# Patient Record
Sex: FEMALE | Race: WHITE
[De-identification: names, ages, dates, MRNs, and addresses within clinical notes are randomized per-mention and may not be internally consistent; named-entity substitution may affect disease eponyms.]

---

## 2018-11-07 ENCOUNTER — HOSPITAL ENCOUNTER (INPATIENT)
Dept: HOSPITAL 36 - ER | Age: 83
LOS: 9 days | Discharge: NURSING, REHAB OR LONG-TERM CARE | DRG: 885 | End: 2018-11-16
Attending: PSYCHIATRY & NEUROLOGY | Admitting: PSYCHIATRY & NEUROLOGY
Payer: MEDICARE

## 2018-11-07 DIAGNOSIS — F29: Primary | ICD-10-CM

## 2018-11-07 DIAGNOSIS — M19.90: ICD-10-CM

## 2018-11-07 DIAGNOSIS — I10: ICD-10-CM

## 2018-11-07 DIAGNOSIS — Z88.8: ICD-10-CM

## 2018-11-07 DIAGNOSIS — Z88.2: ICD-10-CM

## 2018-11-07 DIAGNOSIS — E78.5: ICD-10-CM

## 2018-11-07 DIAGNOSIS — F03.90: ICD-10-CM

## 2018-11-07 DIAGNOSIS — F41.9: ICD-10-CM

## 2018-11-07 DIAGNOSIS — Z82.49: ICD-10-CM

## 2018-11-07 PROCEDURE — Z7610: HCPCS

## 2018-11-07 NOTE — ED PHYSICIAN CHART
ED Chief Complaint/HPI





- Patient Information


Date Seen:: 11/07/18


Time Seen:: 23:40


Chief Complaint:: Agitation


History of Present Illness:: 





onset x 3 days of agitation and hostile behavior; no report of trauma, H/As, SIs

, S/T, neck pain, cough, C/P, SOB, Abd. Pain, A/N/V/D/C, fever, chills, or 

urinary s/s


Allergies:: 


 Allergies











Allergy/AdvReac Type Severity Reaction Status Date / Time


 


cilostazol [From Pletal] Allergy   Verified 11/07/18 23:48


 


ciprofloxacin [From Cipro] Allergy   Verified 11/07/18 23:48


 


Sulfa (Sulfonamide Allergy   Verified 11/07/18 23:48





Antibiotics)     


 


varenicline Allergy   Verified 11/07/18 23:48











Historian:: Patient, EMS


Review:: Nurse's Note Reviewed, Old Chart Reviewed, EMS run form Reviewed





ED Review of Systems





- Review of Systems


General/Constitutional: No fever, No chills, No weight loss, No weakness, No 

diaphoresis, No edema, No loss of appetite


Skin: No skin lesions, No rash, No bruising


Head: No headache, No light-headedness


Eyes: No loss of vision, No pain, No diplopia


ENT: No earache, No nasal drainage, No sore throat, No tinnitus


Neck: No neck pain, No swelling, No thyromegaly, No stiffness, No mass noted


Cardio Vascular: No chest pain, No palpitations, No PND, No orthopnea, No edema


Pulmonary: No SOB, No cough, No sputum, No wheezing


GI: No nausea, No vomiting, No diarrhea, No pain, No melena, No hematochezia, 

No constipation, No hematemesis


G/U: No dysuria, No frequency, No hematuria, No nacturia


Ob/Gyn: No vaginal discharge, No abnormal vaginal bleed, No contraction


Musculoskeletal: No bone or joint pain, No back pain, No muscle pain


Endocrine: No polyuria, No polydipsia


Psychiatric: Prior psych history, Depression, Anxiety, No suicidal ideation, No 

homicidal ideation, No auditory hallucination, No visual hallucination


Hematopoietic: No bruising, No lymphadenopathy


Allergic/Immuno: No urticaria, No angioedema


Neurological: No syncope, No focal symptoms, No weakness, No paresthesia, No 

headache, No seizure, No dizziness, No confusion, No vertigo





ED Past Medical History





- Past Medical History


Obtainable: Yes


Past Medical History: HTN, Dyslipidemia, Arthritis, Dementia


Family History: HTN


Social History: Non Smoker, No Alcohol, No Drug Use, , Care Facility


Surgical History: None


Psychiatricy History: Bipolar, Dementia


Medication: Reviewed





Family Medical History





- Family Member


  ** Mother


History Unknown: Yes





ED Physical Exam





- Physical Examination


General/Constitutional: Awake, Well-developed, well-nourished, Alert, No 

distress, GCS 15, Non-toxic appearing, Ambulatory


Head: Atraumatic


Eyes: Lids, conjuctiva normal, PERRL, EOMI


Skin: Nl inspection, No rash, No skin lesions, No ecchymosis, Well hydrated, No 

lymphadenopathy


ENMT: External ears, nose nl, TM canals nl, Nasal exam nl, Lips, teeth, gums nl

, Oropharynx nl, Tonsils nl


Neck: Nontender, Full ROM w/o pain, No JVD, No nuchal rigidity, No bruit, No 

mass, No stridor


Respiratory: Nl effort/Exclusion, Clear to Auscultation, No Wheeze/Rhonchi/Rales


Cardio Vascular: RRR, No murmur, gallop, rubs, NL S1 S2


GI: No tenderness/rebounding/guarding, No organomegaly, No hernia, Normal BS's, 

Nondistended, No mass/bruits, No McBurney tenderness


: No CVA tenderness


Extremities: No tenderness or effusion, Full ROM, normal strength in all 

extremities, No edema, Normal digits & nails


Neuro/Psych: Alert/oriented, DTR's symmetric, Normal sensory exam, Normal motor 

strength, Judgement/insight normal, Mood normal, Normal gait, No focal deficits


Other Neuro/Psych comments:: 





+ Psychomotor Agitation; no SIs; Mood/Affect: Labile


Misc: Normal back, No paraspinal tenderness





ED Labs/Radiology/EKG Results





- Lab Results


Comments:: 





Reviewed





- EKG Interpretations


EKG Time:: 00:05


Rate & Rhythm: 71; NSR


Comments:: 





non-specific st-t changes





ED Septic Shock





- .


Is Septic Shock (SBP<90, OR Lactate>4 mmol\L) present?: No





ED Reassessment (Disposition)





- Reassessment


Reassessment Condition:: Improved





- Diagnosis


Diagnosis:: 





Agitation; Medical Clearance; Psychosis; Bipolar Disorder





- Aftercare/Follow up Instructions


Aftercare/Follow-Up Instructions:: Counseled pt regarding lab results/diagnosis 

& need follow up, Counseled pt & family regarding lab results/diagnosis & need 

follow up





- Patient Disposition


Discharge/Transfer:: Acute Care w/in this hosp


Admitted to:: [facility]


Condition at Disposition:: Stable, Improved

## 2018-11-08 VITALS — DIASTOLIC BLOOD PRESSURE: 58 MMHG | SYSTOLIC BLOOD PRESSURE: 116 MMHG

## 2018-11-08 LAB
ALBUMIN SERPL-MCNC: 3.1 GM/DL (ref 3.7–5.3)
ALBUMIN/GLOB SERPL: 1 {RATIO} (ref 1–1.8)
ALP SERPL-CCNC: 143 U/L (ref 34–104)
ALT SERPL-CCNC: 12 U/L (ref 7–52)
AMPHET UR-MCNC: NEGATIVE NG/ML
ANION GAP SERPL CALC-SCNC: 11.1 MMOL/L (ref 7–16)
APAP SERPL-MCNC: < 10 UG/ML (ref 10–30)
APPEARANCE UR: CLEAR
AST SERPL-CCNC: 33 U/L (ref 13–39)
BACTERIA #/AREA URNS HPF: (no result) /HPF
BARBITURATES UR-MCNC: NEGATIVE UG/ML
BASOPHILS # BLD AUTO: 0.1 TH/CUMM (ref 0–0.2)
BASOPHILS NFR BLD AUTO: 1 % (ref 0–2)
BENZODIAZEPINES PNL UR: NEGATIVE
BILIRUB SERPL-MCNC: 0.2 MG/DL (ref 0.3–1)
BILIRUB UR-MCNC: NEGATIVE MG/DL
BUN SERPL-MCNC: 42 MG/DL (ref 7–25)
CALCIUM SERPL-MCNC: 8.8 MG/DL (ref 8.6–10.3)
CANNABINOIDS SERPL QL CFM: NEGATIVE
CHLORIDE SERPL-SCNC: 99 MEQ/L (ref 98–107)
CHOLEST SERPL-MCNC: 127 MG/DL (ref ?–200)
CHOLEST SERPL-MCNC: 142 MG/DL (ref ?–200)
CO2 SERPL-SCNC: 26.2 MEQ/L (ref 21–31)
COCAINE METAB.OTHER UR-MCNC: NEGATIVE NG/ML
COLOR UR: (no result)
CREAT SERPL-MCNC: 0.9 MG/DL (ref 0.6–1.2)
EOSINOPHIL # BLD AUTO: 0.4 TH/CMM (ref 0.1–0.4)
EOSINOPHIL NFR BLD AUTO: 5.4 % (ref 0–5)
EPI CELLS URNS QL MICRO: (no result) /LPF
ERYTHROCYTE [DISTWIDTH] IN BLOOD BY AUTOMATED COUNT: 16 % (ref 11.5–20)
GLOBULIN SER-MCNC: 3.1 GM/DL
GLUCOSE SERPL-MCNC: 145 MG/DL (ref 70–105)
GLUCOSE UR STRIP-MCNC: NEGATIVE MG/DL
HCT VFR BLD CALC: 29.5 % (ref 41–60)
HDLC SERPL-MCNC: 52 MG/DL (ref 23–92)
HDLC SERPL-MCNC: 58 MG/DL (ref 23–92)
HGB BLD-MCNC: 10.1 GM/DL (ref 12–16)
KETONES UR STRIP-MCNC: (no result) MG/DL
LEUKOCYTE ESTERASE UR-ACNC: NEGATIVE
LYMPHOCYTE AB SER FC-ACNC: 1.7 TH/CMM (ref 1.5–3)
LYMPHOCYTES NFR BLD AUTO: 20.6 % (ref 20–50)
MCH RBC QN AUTO: 30 PG (ref 27–31)
MCHC RBC AUTO-ENTMCNC: 34.2 PG (ref 28–36)
MCV RBC AUTO: 87.6 FL (ref 81–100)
METHADONE UR CFM-MCNC: NEGATIVE NG/ML
METHAMPHET UR QL: NEGATIVE
MICRO URNS: YES
MONOCYTES # BLD AUTO: 0.9 TH/CMM (ref 0.3–1)
MONOCYTES NFR BLD AUTO: 11.4 % (ref 2–10)
NEUTROPHILS # BLD: 5 TH/CMM (ref 1.8–8)
NEUTROPHILS NFR BLD AUTO: 61.6 % (ref 40–80)
NITRITE UR QL STRIP: NEGATIVE
OPIATES UR QL: NEGATIVE
PCP UR-MCNC: NEGATIVE UG/L
PH UR STRIP: 5.5 [PH] (ref 4.6–8)
PLATELET # BLD: 343 TH/CMM (ref 150–400)
PMV BLD AUTO: 7.6 FL
POTASSIUM SERPL-SCNC: 4.3 MEQ/L (ref 3.5–5.1)
PROT UR STRIP-MCNC: NEGATIVE MG/DL
RBC # BLD AUTO: 3.36 MIL/CMM (ref 3.8–5.2)
RBC # UR STRIP: NEGATIVE /UL
RBC #/AREA URNS HPF: (no result) /HPF (ref 0–5)
SALICYLATES SERPL-MCNC: < 25 MG/L (ref 30–100)
SODIUM SERPL-SCNC: 132 MEQ/L (ref 136–145)
SP GR UR STRIP: 1.02 (ref 1–1.03)
TRICYCLICS UR QL: NEGATIVE
TRIGL SERPL-MCNC: 51 MG/DL (ref ?–150)
TRIGL SERPL-MCNC: 70 MG/DL (ref ?–150)
URINALYSIS COMPLETE PNL UR: (no result)
UROBILINOGEN UR STRIP-ACNC: 0.2 E.U./DL (ref 0.2–1)
WBC # BLD AUTO: 8.1 TH/CMM (ref 4.8–10.8)
WBC #/AREA URNS HPF: (no result) /HPF (ref 0–5)

## 2018-11-08 RX ADMIN — TOLTERODINE TARTRATE SCH: 4 CAPSULE, EXTENDED RELEASE ORAL at 09:56

## 2018-11-08 RX ADMIN — FLUTICASONE PROPIONATE SCH: 50 SPRAY, METERED NASAL at 09:56

## 2018-11-08 RX ADMIN — ASPIRIN 81 MG SCH MG: 81 TABLET ORAL at 09:33

## 2018-11-09 RX ADMIN — TOLTERODINE TARTRATE SCH MG: 4 CAPSULE, EXTENDED RELEASE ORAL at 09:22

## 2018-11-09 RX ADMIN — FLUTICASONE PROPIONATE SCH SPR: 50 SPRAY, METERED NASAL at 09:21

## 2018-11-09 RX ADMIN — ASPIRIN 81 MG SCH MG: 81 TABLET ORAL at 09:20
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
## 2018-11-09 NOTE — INTERNAL MEDICINE PROG NOTE
Internal Medicine Subjective





- Subjective


Patient seen and examined:: chart reviewed


Patient is:: awake, agitated, confused


Per staff patient has:: agitated, tolerating meds





Internal Medicine Objective





- Results


Result Diagrams: 


 11/07/18 00:00





 11/07/18 00:00


Recent Labs: 


 Laboratory Last Values











WBC  8.1 Th/cmm (4.8-10.8)   11/07/18  00:00    


 


RBC  3.36 Mil/cmm (3.80-5.20)  L  11/07/18  00:00    


 


Hgb  10.1 gm/dL (12-16)  L  11/07/18  00:00    


 


Hct  29.5 % (41.0-60)  L  11/07/18  00:00    


 


MCV  87.6 fl ()   11/07/18  00:00    


 


MCH  30.0 pg (27.0-31.0)   11/07/18  00:00    


 


MCHC Differential  34.2 pg (28.0-36.0)   11/07/18  00:00    


 


RDW  16.0 % (11.5-20.0)   11/07/18  00:00    


 


Plt Count  343 Th/cmm (150-400)   11/07/18  00:00    


 


MPV  7.6 fl  11/07/18  00:00    


 


Neutrophils %  61.6 % (40.0-80.0)   11/07/18  00:00    


 


Lymphocytes %  20.6 % (20.0-50.0)   11/07/18  00:00    


 


Monocytes %  11.4 % (2.0-10.0)  H  11/07/18  00:00    


 


Eosinophils %  5.4 % (0.0-5.0)  H  11/07/18  00:00    


 


Basophils %  1.0 % (0.0-2.0)   11/07/18  00:00    


 


Sodium  132 mEq/L (136-145)  L  11/07/18  00:00    


 


Potassium  4.3 mEq/L (3.5-5.1)   11/07/18  00:00    


 


Chloride  99 mEq/L ()   11/07/18  00:00    


 


Carbon Dioxide  26.2 mEq/L (21.0-31.0)   11/07/18  00:00    


 


Anion Gap  11.1  (7.0-16.0)   11/07/18  00:00    


 


BUN  42 mg/dL (7-25)  H  11/07/18  00:00    


 


Creatinine  0.9 mg/dL (0.6-1.2)   11/07/18  00:00    


 


Est GFR ( Amer)  TNP   11/07/18  00:00    


 


Est GFR (Non-Af Amer)  TNP   11/07/18  00:00    


 


BUN/Creatinine Ratio  46.7   11/07/18  00:00    


 


Glucose  145 mg/dL ()  H  11/07/18  00:00    


 


Calcium  8.8 mg/dL (8.6-10.3)   11/07/18  00:00    


 


Total Bilirubin  0.2 mg/dL (0.3-1.0)  L  11/07/18  00:00    


 


AST  33 U/L (13-39)   11/07/18  00:00    


 


ALT  12 U/L (7-52)   11/07/18  00:00    


 


Alkaline Phosphatase  143 U/L ()  H  11/07/18  00:00    


 


Troponin I  0.02 ng/mL (0.01-0.05)   11/07/18  00:00    


 


Total Protein  6.2 gm/dL (6.0-8.3)   11/07/18  00:00    


 


Albumin  3.1 gm/dL (3.7-5.3)  L  11/07/18  00:00    


 


Globulin  3.1 gm/dL  11/07/18  00:00    


 


Albumin/Globulin Ratio  1.0  (1.0-1.8)   11/07/18  00:00    


 


Triglycerides  51 mg/dL (<150)   11/08/18  07:29    


 


Cholesterol  142 mg/dL (<200)   11/08/18  07:29    


 


LDL Cholesterol Direct  77 mg/dL ()   11/08/18  07:29    


 


HDL Cholesterol  58 mg/dL (23-92)   11/08/18  07:29    


 


TSH  2.21 uIU/ml (0.34-5.60)   11/07/18  00:00    


 


Urine Source  CATH   11/08/18  00:55    


 


Urine Color  STRAW   11/08/18  00:55    


 


Urine Clarity  CLEAR  (CLEAR)   11/08/18  00:55    


 


Urine pH  5.5  (4.6 - 8.0)   11/08/18  00:55    


 


Ur Specific Gravity  1.025  (1.005-1.030)   11/08/18  00:55    


 


Urine Protein  NEGATIVE mg/dL (NEGATIVE)   11/08/18  00:55    


 


Urine Glucose (UA)  NEGATIVE mg/dL (NEGATIVE)   11/08/18  00:55    


 


Urine Ketones  TRACE mg/dL (NEGATIVE)   11/08/18  00:55    


 


Urine Blood  NEGATIVE  (NEGATIVE)   11/08/18  00:55    


 


Urine Nitrate  NEGATIVE  (NEGATIVE)   11/08/18  00:55    


 


Urine Bilirubin  NEGATIVE  (NEGATIVE)   11/08/18  00:55    


 


Urine Urobilinogen  0.2 E.U./dL (0.2 - 1.0)   11/08/18  00:55    


 


Ur Leukocyte Esterase  NEGATIVE  (NEGATIVE)   11/08/18  00:55    


 


Urine RBC  NONE SEEN /hpf (0-5)   11/08/18  00:55    


 


Urine WBC  0-2 /hpf (0-5)   11/08/18  00:55    


 


Ur Epithelial Cells  FEW /lpf (FEW)   11/08/18  00:55    


 


Urine Bacteria  NONE SEEN /hpf (NONE SEEN)   11/08/18  00:55    


 


Salicylates  < 25.0 mg/L (30.0-100.0)  L  11/07/18  00:00    


 


Urine Opiates Screen  NEGATIVE  (NEGATIVE)   11/08/18  00:55    


 


Urine Methadone Screen  NEGATIVE  (NEGATIVE)   11/08/18  00:55    


 


Acetaminophen  < 10.0 ug/mL (10.0-30.0)  L  11/07/18  00:00    


 


Ur Barbiturates Screen  NEGATIVE  (NEGATIVE)   11/08/18  00:55    


 


Ur Tricyclics Screen  NEGATIVE  (NEGATIVE)   11/08/18  00:55    


 


Ur Phencyclidine Scrn  NEGATIVE  (NEGATIVE)   11/08/18  00:55    


 


Amphetamines Screen  NEGATIVE  (NEGATIVE)   11/08/18  00:55    


 


U Methamphetamines Scrn  NEGATIVE  (NEGATIVE)   11/08/18  00:55    


 


U Benzodiazepines Scrn  NEGATIVE  (NEGATIVE)   11/08/18  00:55    


 


U Cocaine Metab Screen  NEGATIVE  (NEGATIVE)   11/08/18  00:55    


 


U Cannabinoids Screen  NEGATIVE  (NEGATIVE)   11/08/18  00:55    


 


Ethyl Alcohol  < 10 mg/dL (0-10)   11/07/18  00:00    














- Physical Exam


Vitals and I&O: 


 Vital Signs











Temp  97.1 F   11/08/18 21:03


 


Pulse  86   11/08/18 21:24


 


Resp  20   11/08/18 21:03


 


BP  128/52   11/08/18 21:24


 


Pulse Ox  98   11/08/18 21:03








 Intake & Output











 11/08/18 11/09/18 11/09/18





 18:59 06:59 18:59


 


Intake Total 800 120 


 


Balance 800 120 


 


Intake:   


 


  Oral 800 120 


 


Other:   


 


  # Voids 3 3 


 


  # Bowel Movements 0  











Active Medications: 


Current Medications





Acetaminophen (Tylenol)  650 mg PO Q4HR PRN


   PRN Reason: Mild Pain / Temp above 100


   Stop: 01/07/19 02:37


Acetaminophen (Tylenol)  650 mg PO Q4HR PRN


   PRN Reason: Pain (Mild)


   Stop: 01/07/19 07:56


Al Hydrox/Mg Hydrox/Simethicone (Maalox)  30 ml PO Q4HR PRN


   PRN Reason: GI DISTRESS


   Stop: 01/07/19 02:37


Aspirin (Aspirin Chewable)  81 mg PO DAILY [facility]


   Stop: 01/07/19 08:59


   Last Admin: 11/09/18 09:20 Dose:  81 mg


Bisacodyl (Dulcolax 10 Mg Supp)  10 mg RC DAILY PRN


   PRN Reason: Constipation


   Stop: 01/07/19 03:02


Buspirone HCl (Buspar)  10 mg PO BID [facility]; Protocol


   Stop: 01/07/19 08:59


   Last Admin: 11/09/18 09:20 Dose:  10 mg


Calcium Carbonate (Os-[place])  500 mg PO DAILY [facility]


   Stop: 01/07/19 08:59


   Last Admin: 11/09/18 09:21 Dose:  500 mg


Cholecalciferol (Vitamin D3)  1,000 iu PO DAILY [facility]


   Stop: 01/07/19 08:59


   Last Admin: 11/09/18 09:22 Dose:  1,000 iu


Cilostazol (Pletal)  100 mg PO BID [facility]


   Stop: 01/07/19 08:59


   Last Admin: 11/09/18 09:24 Dose:  Not Given


Docusate Sodium (Colace)  100 mg PO BID [facility]


   Stop: 01/07/19 08:59


   Last Admin: 11/09/18 09:21 Dose:  100 mg


Donepezil HCl (Aricept)  10 mg PO HS [facility]


   Stop: 01/07/19 20:59


   Last Admin: 11/08/18 21:22 Dose:  10 mg


Famotidine (Pepcid)  20 mg PO BID [facility]


   Stop: 01/07/19 08:59


   Last Admin: 11/09/18 09:21 Dose:  20 mg


Fluticasone Propionate (Flonase)  2 spr NS DAILY [facility]


   Stop: 01/07/19 08:59


   Last Admin: 11/09/18 09:21 Dose:  2 spr


Gabapentin (Neurontin)  300 mg PO HS [facility]


   Stop: 01/07/19 20:59


   Last Admin: 11/08/18 21:22 Dose:  300 mg


Lorazepam (Ativan)  0.5 mg PO Q4HR PRN; Protocol


   PRN Reason: Anxiety


   Stop: 12/08/18 02:37


Magnesium Hydroxide (Milk Of Magnesia)  30 ml PO HS PRN


   PRN Reason: Constipation


   Stop: 01/07/19 07:56


Memantine (Namenda)  10 mg PO DAILY [facility]


   Stop: 01/07/19 08:59


   Last Admin: 11/09/18 09:21 Dose:  10 mg


Metoprolol Tartrate (Lopressor)  25 mg PO Q12HR [facility]


   Stop: 01/07/19 08:59


   Last Admin: 11/09/18 09:21 Dose:  Not Given


Quetiapine Fumarate (Seroquel)  12.5 mg PO BID [facility]; Protocol


   Stop: 01/08/19 16:59


Sodium Phosphate (Fleet Enema)  135 ml RC Q48HR PRN


   PRN Reason: Constipation


   Stop: 01/07/19 03:02


Tolterodine Tartrate (Detrol La)  4 mg PO DAILY [facility]


   Stop: 01/07/19 08:59


   Last Admin: 11/09/18 09:22 Dose:  4 mg


Zolpidem Tartrate (Ambien)  5 mg PO HS PRN


   PRN Reason: Insomnia


   Stop: 01/07/19 02:37








General: demented


HEENT: NC/AT


Neck: Supple


Lungs: CTAB


Cardiovascular: Normal S1, Normal S2


Abdomen: soft, non-tender


Extremities: clear


Neurological: no change





Internal Medicine Assmt/Plan





- Assessment


Assessment: 





severe agitation 


bipolar disease 


h/o dementia


h/o arthritis 


h/o htn


h/o hyperlipidemia








- Plan


Plan: 





as per psych


will monitor

## 2018-11-10 RX ADMIN — TOLTERODINE TARTRATE SCH MG: 4 CAPSULE, EXTENDED RELEASE ORAL at 09:24

## 2018-11-10 RX ADMIN — ASPIRIN 81 MG SCH MG: 81 TABLET ORAL at 09:23

## 2018-11-10 RX ADMIN — FLUTICASONE PROPIONATE SCH SPR: 50 SPRAY, METERED NASAL at 09:23

## 2018-11-10 NOTE — PROGRESS NOTES
DATE:  11/10/2018



SUBJECTIVE:  The patient was seen at the hallway sitting in a [place] chair.  The

patient is awake, but confused and easily gets agitated and frustrated, appears

to be guarded.  Otherwise, the patient is in no acute distress.



OBJECTIVE:

VITAL SIGNS:  Temperature 97.8, heart rate 76, blood pressure 164/66,

respirations 20, and 96% on room air.

HEENT:  Head is atraumatic and normocephalic.  Eyes:  Bilateral conjunctivae are

clear.  Bilateral pupils are equally round and reactive.

NECK:  Supple.  No JVD.

CARDIOVASCULAR:  S1 and S2, without murmur.

PULMONARY:  Clear to auscultation.

GASTROINTESTINAL:  Soft and nontender without guarding.  Positive bowel sounds.

MUSCULOSKELETAL:  No clubbing.  No cyanosis noted.



ASSESSMENT:

1.  Psychosis.

2.  Hypertension.

3.  Hyperlipidemia.

4.  Osteoarthritis.



PLAN:  We will keep the patient inpatient Psychiatric Unit.  We will follow up

with the psychiatrist to monitor the patient's condition and behavior. 

Treatment plans were discussed with the patient's nurse.  Treatment plans were

discussed with Dr. Gomes.  Also going to start the patient on clonidine 0.1 mg

every 8 hours as needed for a systolic blood pressure above 160.





DD: 11/10/2018 07:53

DT: 11/10/2018 16:22

JOB# [id number]  [id number]

## 2018-11-11 RX ADMIN — FLUTICASONE PROPIONATE SCH: 50 SPRAY, METERED NASAL at 09:42

## 2018-11-11 RX ADMIN — TOLTERODINE TARTRATE SCH: 4 CAPSULE, EXTENDED RELEASE ORAL at 16:10

## 2018-11-11 RX ADMIN — ASPIRIN 81 MG SCH MG: 81 TABLET ORAL at 09:37
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
## 2018-11-11 NOTE — INTERNAL MEDICINE PROG NOTE
Internal Medicine Subjective





- Subjective


Patient seen and examined:: chart reviewed


Patient is:: awake, agitated, confused


Per staff patient has:: agitated, tolerating meds





Internal Medicine Objective





- Results


Result Diagrams: 


 11/07/18 00:00





 11/07/18 00:00


Recent Labs: 


 Laboratory Last Values











WBC  8.1 Th/cmm (4.8-10.8)   11/07/18  00:00    


 


RBC  3.36 Mil/cmm (3.80-5.20)  L  11/07/18  00:00    


 


Hgb  10.1 gm/dL (12-16)  L  11/07/18  00:00    


 


Hct  29.5 % (41.0-60)  L  11/07/18  00:00    


 


MCV  87.6 fl ()   11/07/18  00:00    


 


MCH  30.0 pg (27.0-31.0)   11/07/18  00:00    


 


MCHC Differential  34.2 pg (28.0-36.0)   11/07/18  00:00    


 


RDW  16.0 % (11.5-20.0)   11/07/18  00:00    


 


Plt Count  343 Th/cmm (150-400)   11/07/18  00:00    


 


MPV  7.6 fl  11/07/18  00:00    


 


Neutrophils %  61.6 % (40.0-80.0)   11/07/18  00:00    


 


Lymphocytes %  20.6 % (20.0-50.0)   11/07/18  00:00    


 


Monocytes %  11.4 % (2.0-10.0)  H  11/07/18  00:00    


 


Eosinophils %  5.4 % (0.0-5.0)  H  11/07/18  00:00    


 


Basophils %  1.0 % (0.0-2.0)   11/07/18  00:00    


 


Sodium  132 mEq/L (136-145)  L  11/07/18  00:00    


 


Potassium  4.3 mEq/L (3.5-5.1)   11/07/18  00:00    


 


Chloride  99 mEq/L ()   11/07/18  00:00    


 


Carbon Dioxide  26.2 mEq/L (21.0-31.0)   11/07/18  00:00    


 


Anion Gap  11.1  (7.0-16.0)   11/07/18  00:00    


 


BUN  42 mg/dL (7-25)  H  11/07/18  00:00    


 


Creatinine  0.9 mg/dL (0.6-1.2)   11/07/18  00:00    


 


Est GFR ( Amer)  TNP   11/07/18  00:00    


 


Est GFR (Non-Af Amer)  TNP   11/07/18  00:00    


 


BUN/Creatinine Ratio  46.7   11/07/18  00:00    


 


Glucose  145 mg/dL ()  H  11/07/18  00:00    


 


Calcium  8.8 mg/dL (8.6-10.3)   11/07/18  00:00    


 


Total Bilirubin  0.2 mg/dL (0.3-1.0)  L  11/07/18  00:00    


 


AST  33 U/L (13-39)   11/07/18  00:00    


 


ALT  12 U/L (7-52)   11/07/18  00:00    


 


Alkaline Phosphatase  143 U/L ()  H  11/07/18  00:00    


 


Troponin I  0.02 ng/mL (0.01-0.05)   11/07/18  00:00    


 


Total Protein  6.2 gm/dL (6.0-8.3)   11/07/18  00:00    


 


Albumin  3.1 gm/dL (3.7-5.3)  L  11/07/18  00:00    


 


Globulin  3.1 gm/dL  11/07/18  00:00    


 


Albumin/Globulin Ratio  1.0  (1.0-1.8)   11/07/18  00:00    


 


Triglycerides  51 mg/dL (<150)   11/08/18  07:29    


 


Cholesterol  142 mg/dL (<200)   11/08/18  07:29    


 


LDL Cholesterol Direct  77 mg/dL ()   11/08/18  07:29    


 


HDL Cholesterol  58 mg/dL (23-92)   11/08/18  07:29    


 


TSH  2.21 uIU/ml (0.34-5.60)   11/07/18  00:00    


 


Urine Source  CATH   11/08/18  00:55    


 


Urine Color  STRAW   11/08/18  00:55    


 


Urine Clarity  CLEAR  (CLEAR)   11/08/18  00:55    


 


Urine pH  5.5  (4.6 - 8.0)   11/08/18  00:55    


 


Ur Specific Gravity  1.025  (1.005-1.030)   11/08/18  00:55    


 


Urine Protein  NEGATIVE mg/dL (NEGATIVE)   11/08/18  00:55    


 


Urine Glucose (UA)  NEGATIVE mg/dL (NEGATIVE)   11/08/18  00:55    


 


Urine Ketones  TRACE mg/dL (NEGATIVE)   11/08/18  00:55    


 


Urine Blood  NEGATIVE  (NEGATIVE)   11/08/18  00:55    


 


Urine Nitrate  NEGATIVE  (NEGATIVE)   11/08/18  00:55    


 


Urine Bilirubin  NEGATIVE  (NEGATIVE)   11/08/18  00:55    


 


Urine Urobilinogen  0.2 E.U./dL (0.2 - 1.0)   11/08/18  00:55    


 


Ur Leukocyte Esterase  NEGATIVE  (NEGATIVE)   11/08/18  00:55    


 


Urine RBC  NONE SEEN /hpf (0-5)   11/08/18  00:55    


 


Urine WBC  0-2 /hpf (0-5)   11/08/18  00:55    


 


Ur Epithelial Cells  FEW /lpf (FEW)   11/08/18  00:55    


 


Urine Bacteria  NONE SEEN /hpf (NONE SEEN)   11/08/18  00:55    


 


Salicylates  < 25.0 mg/L (30.0-100.0)  L  11/07/18  00:00    


 


Urine Opiates Screen  NEGATIVE  (NEGATIVE)   11/08/18  00:55    


 


Urine Methadone Screen  NEGATIVE  (NEGATIVE)   11/08/18  00:55    


 


Acetaminophen  < 10.0 ug/mL (10.0-30.0)  L  11/07/18  00:00    


 


Ur Barbiturates Screen  NEGATIVE  (NEGATIVE)   11/08/18  00:55    


 


Ur Tricyclics Screen  NEGATIVE  (NEGATIVE)   11/08/18  00:55    


 


Ur Phencyclidine Scrn  NEGATIVE  (NEGATIVE)   11/08/18  00:55    


 


Amphetamines Screen  NEGATIVE  (NEGATIVE)   11/08/18  00:55    


 


U Methamphetamines Scrn  NEGATIVE  (NEGATIVE)   11/08/18  00:55    


 


U Benzodiazepines Scrn  NEGATIVE  (NEGATIVE)   11/08/18  00:55    


 


U Cocaine Metab Screen  NEGATIVE  (NEGATIVE)   11/08/18  00:55    


 


U Cannabinoids Screen  NEGATIVE  (NEGATIVE)   11/08/18  00:55    


 


Ethyl Alcohol  < 10 mg/dL (0-10)   11/07/18  00:00    


 


RPR  NONREACTIVE  (NONREACTIVE)   11/07/18  00:00    














- Physical Exam


Vitals and I&O: 


 Vital Signs











Temp  97.2 F   11/11/18 06:30


 


Pulse  94   11/11/18 09:36


 


Resp  20   11/11/18 06:30


 


BP  166/96   11/11/18 09:36


 


Pulse Ox  96   11/11/18 06:30








 Intake & Output











 11/10/18 11/11/18 11/11/18





 18:59 06:59 18:59


 


Intake Total 1000 120 


 


Balance 1000 120 


 


Intake:   


 


  Oral 1000 120 


 


Other:   


 


  # Voids 4 1 


 


  # Bowel Movements 1  











Active Medications: 


Current Medications





Acetaminophen (Tylenol)  650 mg PO Q4HR PRN


   PRN Reason: Mild Pain / Temp above 100


   Stop: 01/07/19 02:37


Acetaminophen (Tylenol)  650 mg PO Q4HR PRN


   PRN Reason: Pain (Mild)


   Stop: 01/07/19 07:56


Al Hydrox/Mg Hydrox/Simethicone (Maalox)  30 ml PO Q4HR PRN


   PRN Reason: GI DISTRESS


   Stop: 01/07/19 02:37


Aspirin (Aspirin Chewable)  81 mg PO DAILY [facility]


   Stop: 01/07/19 08:59


   Last Admin: 11/11/18 09:37 Dose:  81 mg


Bisacodyl (Dulcolax 10 Mg Supp)  10 mg RC DAILY PRN


   PRN Reason: Constipation


   Stop: 01/07/19 03:02


Buspirone HCl (Buspar)  10 mg PO BID [facility]; Protocol


   Stop: 01/07/19 08:59


   Last Admin: 11/11/18 09:40 Dose:  10 mg


Calcium Carbonate (Os-[place])  500 mg PO DAILY [facility]


   Stop: 01/07/19 08:59


   Last Admin: 11/11/18 09:41 Dose:  500 mg


Cholecalciferol (Vitamin D3)  1,000 iu PO DAILY [facility]


   Stop: 01/07/19 08:59


   Last Admin: 11/11/18 09:37 Dose:  1,000 iu


Docusate Sodium (Colace)  100 mg PO BID [facility]


   Stop: 01/07/19 08:59


   Last Admin: 11/11/18 09:40 Dose:  100 mg


Donepezil HCl (Aricept)  10 mg PO [facility]


   Stop: 01/07/19 20:59


   Last Admin: 11/10/18 20:59 Dose:  10 mg


Famotidine (Pepcid)  20 mg PO BID [facility]


   Stop: 01/07/19 08:59


   Last Admin: 11/11/18 09:41 Dose:  20 mg


Fluticasone Propionate (Flonase)  2 spr NS DAILY [facility]


   Stop: 01/07/19 08:59


   Last Admin: 11/11/18 09:42 Dose:  Not Given


Gabapentin (Neurontin)  300 mg PO HS [facility]


   Stop: 01/07/19 20:59


   Last Admin: 11/10/18 20:59 Dose:  300 mg


Lorazepam (Ativan)  0.5 mg PO Q4HR PRN; Protocol


   PRN Reason: Anxiety


   Stop: 12/08/18 02:37


   Last Admin: 11/10/18 16:44 Dose:  0.5 mg


Magnesium Hydroxide (Milk Of Magnesia)  30 ml PO HS PRN


   PRN Reason: Constipation


   Stop: 01/07/19 07:56


Memantine (Namenda)  10 mg PO DAILY [facility]


   Stop: 01/07/19 08:59


   Last Admin: 11/11/18 09:41 Dose:  10 mg


Metoprolol Tartrate (Lopressor)  25 mg PO Q12HR [facility]


   Stop: 01/07/19 08:59


   Last Admin: 11/11/18 09:36 Dose:  25 mg


Quetiapine Fumarate (Seroquel)  12.5 mg PO BID [facility]; Protocol


   Stop: 01/08/19 16:59


   Last Admin: 11/11/18 09:37 Dose:  12.5 mg


Sodium Phosphate (Fleet Enema)  135 ml RC Q48HR PRN


   PRN Reason: Constipation


   Stop: 01/07/19 03:02


Tolterodine Tartrate (Detrol La)  4 mg PO DAILY [facility]


   Stop: 01/07/19 08:59


   Last Admin: 11/10/18 09:24 Dose:  4 mg


Zolpidem Tartrate (Ambien)  5 mg PO HS PRN


   PRN Reason: Insomnia


   Stop: 01/07/19 02:37


   Last Admin: 11/10/18 20:59 Dose:  5 mg








General: demented


HEENT: NC/AT


Neck: Supple


Lungs: CTAB


Cardiovascular: Normal S1, Normal S2


Abdomen: soft, non-tender


Extremities: clear


Neurological: no change





Internal Medicine Assmt/Plan





- Assessment


Assessment: 





severe agitation 


bipolar disease 


h/o dementia


h/o arthritis 


h/o htn


h/o hyperlipidemia








- Plan


Plan: 





as per psych


will monitor

## 2018-11-11 NOTE — PROGRESS NOTES
DATE:  11/10/2018



SUBJECTIVE:  The patient was seen and evaluated.  The patient's chart reviewed.



IDENTIFYING DATA:  An 83-year-old female who [facility] because the patient became verbally and physically

aggressive, during the hospital course, the patient mostly been disengaged,

withdrawn, irritable, agitated.  Today on face-to-face evaluation, the patient

is minimally interactive, difficult to engage in a linear conversation, easily

derails.



MEDICATIONS:  Reviewed.  BuSpar 10 mg p.o. b.i.d., vitamin D, Colace, Aricept 10

mg, Pepcid, Neurontin _____ mg p.o. at bedtime, Ativan as needed, Namenda 10 mg

p.o. every day, Seroquel at 12.5 b.i.d. with metoprolol and sodium phosphate.



ASSESSMENT AND PLAN:  The patient continues to be disorganized, easily derails

in conversation and need a lot of redirection, unable to formulate a plan

outside for environment.  We will continue with primary psychiatrist's treatment

plan and goals.





DD: 11/10/2018 14:33

DT: 11/11/2018 06:45

JOB# [id number]  [id number]

## 2018-11-12 RX ADMIN — ASPIRIN 81 MG SCH: 81 TABLET ORAL at 09:17

## 2018-11-12 RX ADMIN — TOLTERODINE TARTRATE SCH: 4 CAPSULE, EXTENDED RELEASE ORAL at 09:17

## 2018-11-12 RX ADMIN — FLUTICASONE PROPIONATE SCH: 50 SPRAY, METERED NASAL at 09:17

## 2018-11-12 NOTE — INTERNAL MEDICINE PROG NOTE
Internal Medicine Subjective





- Subjective


Patient seen and examined:: chart reviewed


Patient is:: awake, confused


Per staff patient has:: tolerating meds





Internal Medicine Objective





- Results


Result Diagrams: 


 11/07/18 00:00





 11/07/18 00:00


Recent Labs: 


 Laboratory Last Values











WBC  8.1 Th/cmm (4.8-10.8)   11/07/18  00:00    


 


RBC  3.36 Mil/cmm (3.80-5.20)  L  11/07/18  00:00    


 


Hgb  10.1 gm/dL (12-16)  L  11/07/18  00:00    


 


Hct  29.5 % (41.0-60)  L  11/07/18  00:00    


 


MCV  87.6 fl ()   11/07/18  00:00    


 


MCH  30.0 pg (27.0-31.0)   11/07/18  00:00    


 


MCHC Differential  34.2 pg (28.0-36.0)   11/07/18  00:00    


 


RDW  16.0 % (11.5-20.0)   11/07/18  00:00    


 


Plt Count  343 Th/cmm (150-400)   11/07/18  00:00    


 


MPV  7.6 fl  11/07/18  00:00    


 


Neutrophils %  61.6 % (40.0-80.0)   11/07/18  00:00    


 


Lymphocytes %  20.6 % (20.0-50.0)   11/07/18  00:00    


 


Monocytes %  11.4 % (2.0-10.0)  H  11/07/18  00:00    


 


Eosinophils %  5.4 % (0.0-5.0)  H  11/07/18  00:00    


 


Basophils %  1.0 % (0.0-2.0)   11/07/18  00:00    


 


Sodium  132 mEq/L (136-145)  L  11/07/18  00:00    


 


Potassium  4.3 mEq/L (3.5-5.1)   11/07/18  00:00    


 


Chloride  99 mEq/L ()   11/07/18  00:00    


 


Carbon Dioxide  26.2 mEq/L (21.0-31.0)   11/07/18  00:00    


 


Anion Gap  11.1  (7.0-16.0)   11/07/18  00:00    


 


BUN  42 mg/dL (7-25)  H  11/07/18  00:00    


 


Creatinine  0.9 mg/dL (0.6-1.2)   11/07/18  00:00    


 


Est GFR ( Amer)  TNP   11/07/18  00:00    


 


Est GFR (Non-Af Amer)  TNP   11/07/18  00:00    


 


BUN/Creatinine Ratio  46.7   11/07/18  00:00    


 


Glucose  145 mg/dL ()  H  11/07/18  00:00    


 


Calcium  8.8 mg/dL (8.6-10.3)   11/07/18  00:00    


 


Total Bilirubin  0.2 mg/dL (0.3-1.0)  L  11/07/18  00:00    


 


AST  33 U/L (13-39)   11/07/18  00:00    


 


ALT  12 U/L (7-52)   11/07/18  00:00    


 


Alkaline Phosphatase  143 U/L ()  H  11/07/18  00:00    


 


Troponin I  0.02 ng/mL (0.01-0.05)   11/07/18  00:00    


 


Total Protein  6.2 gm/dL (6.0-8.3)   11/07/18  00:00    


 


Albumin  3.1 gm/dL (3.7-5.3)  L  11/07/18  00:00    


 


Globulin  3.1 gm/dL  11/07/18  00:00    


 


Albumin/Globulin Ratio  1.0  (1.0-1.8)   11/07/18  00:00    


 


Triglycerides  51 mg/dL (<150)   11/08/18  07:29    


 


Cholesterol  142 mg/dL (<200)   11/08/18  07:29    


 


LDL Cholesterol Direct  77 mg/dL ()   11/08/18  07:29    


 


HDL Cholesterol  58 mg/dL (23-92)   11/08/18  07:29    


 


TSH  2.21 uIU/ml (0.34-5.60)   11/07/18  00:00    


 


Urine Source  CATH   11/08/18  00:55    


 


Urine Color  STRAW   11/08/18  00:55    


 


Urine Clarity  CLEAR  (CLEAR)   11/08/18  00:55    


 


Urine pH  5.5  (4.6 - 8.0)   11/08/18  00:55    


 


Ur Specific Gravity  1.025  (1.005-1.030)   11/08/18  00:55    


 


Urine Protein  NEGATIVE mg/dL (NEGATIVE)   11/08/18  00:55    


 


Urine Glucose (UA)  NEGATIVE mg/dL (NEGATIVE)   11/08/18  00:55    


 


Urine Ketones  TRACE mg/dL (NEGATIVE)   11/08/18  00:55    


 


Urine Blood  NEGATIVE  (NEGATIVE)   11/08/18  00:55    


 


Urine Nitrate  NEGATIVE  (NEGATIVE)   11/08/18  00:55    


 


Urine Bilirubin  NEGATIVE  (NEGATIVE)   11/08/18  00:55    


 


Urine Urobilinogen  0.2 E.U./dL (0.2 - 1.0)   11/08/18  00:55    


 


Ur Leukocyte Esterase  NEGATIVE  (NEGATIVE)   11/08/18  00:55    


 


Urine RBC  NONE SEEN /hpf (0-5)   11/08/18  00:55    


 


Urine WBC  0-2 /hpf (0-5)   11/08/18  00:55    


 


Ur Epithelial Cells  FEW /lpf (FEW)   11/08/18  00:55    


 


Urine Bacteria  NONE SEEN /hpf (NONE SEEN)   11/08/18  00:55    


 


Salicylates  < 25.0 mg/L (30.0-100.0)  L  11/07/18  00:00    


 


Urine Opiates Screen  NEGATIVE  (NEGATIVE)   11/08/18  00:55    


 


Urine Methadone Screen  NEGATIVE  (NEGATIVE)   11/08/18  00:55    


 


Acetaminophen  < 10.0 ug/mL (10.0-30.0)  L  11/07/18  00:00    


 


Ur Barbiturates Screen  NEGATIVE  (NEGATIVE)   11/08/18  00:55    


 


Ur Tricyclics Screen  NEGATIVE  (NEGATIVE)   11/08/18  00:55    


 


Ur Phencyclidine Scrn  NEGATIVE  (NEGATIVE)   11/08/18  00:55    


 


Amphetamines Screen  NEGATIVE  (NEGATIVE)   11/08/18  00:55    


 


U Methamphetamines Scrn  NEGATIVE  (NEGATIVE)   11/08/18  00:55    


 


U Benzodiazepines Scrn  NEGATIVE  (NEGATIVE)   11/08/18  00:55    


 


U Cocaine Metab Screen  NEGATIVE  (NEGATIVE)   11/08/18  00:55    


 


U Cannabinoids Screen  NEGATIVE  (NEGATIVE)   11/08/18  00:55    


 


Ethyl Alcohol  < 10 mg/dL (0-10)   11/07/18  00:00    


 


RPR  NONREACTIVE  (NONREACTIVE)   11/07/18  00:00    














- Physical Exam


Vitals and I&O: 


 Vital Signs











Temp  98.2 F   11/12/18 06:24


 


Pulse  78   11/12/18 06:24


 


Resp  19   11/12/18 06:24


 


BP  127/59   11/12/18 06:24


 


Pulse Ox  94   11/12/18 06:24








 Intake & Output











 11/11/18 11/12/18 11/12/18





 18:59 06:59 18:59


 


Other:   


 


  # Voids  3 


 


  # Bowel Movements  1 











Active Medications: 


Current Medications





Acetaminophen (Tylenol)  650 mg PO Q4HR PRN


   PRN Reason: Mild Pain / Temp above 100


   Stop: 01/07/19 02:37


Acetaminophen (Tylenol)  650 mg PO Q4HR PRN


   PRN Reason: Pain (Mild)


   Stop: 01/07/19 07:56


Al Hydrox/Mg Hydrox/Simethicone (Maalox)  30 ml PO Q4HR PRN


   PRN Reason: GI DISTRESS


   Stop: 01/07/19 02:37


Aspirin (Aspirin Chewable)  81 mg PO DAILY [facility]


   Stop: 01/07/19 08:59


   Last Admin: 11/12/18 09:17 Dose:  Not Given


Bisacodyl (Dulcolax 10 Mg Supp)  10 mg RC DAILY PRN


   PRN Reason: Constipation


   Stop: 01/07/19 03:02


Buspirone HCl (Buspar)  10 mg PO BID [facility]; Protocol


   Stop: 01/07/19 08:59


   Last Admin: 11/12/18 09:17 Dose:  Not Given


Calcium Carbonate (Os-[place])  500 mg PO DAILY [facility]


   Stop: 01/07/19 08:59


   Last Admin: 11/12/18 09:17 Dose:  Not Given


Cholecalciferol (Vitamin D3)  1,000 iu PO DAILY [facility]


   Stop: 01/07/19 08:59


   Last Admin: 11/12/18 09:17 Dose:  Not Given


Docusate Sodium (Colace)  100 mg PO BID [facility]


   Stop: 01/07/19 08:59


   Last Admin: 11/12/18 09:17 Dose:  Not Given


Donepezil HCl (Aricept)  10 mg PO HS [facility]


   Stop: 01/07/19 20:59


   Last Admin: 11/11/18 21:13 Dose:  10 mg


Famotidine (Pepcid)  20 mg PO BID [facility]


   Stop: 01/07/19 08:59


   Last Admin: 11/12/18 09:17 Dose:  Not Given


Fluticasone Propionate (Flonase)  2 spr NS DAILY [facility]


   Stop: 01/07/19 08:59


   Last Admin: 11/12/18 09:17 Dose:  Not Given


Gabapentin (Neurontin)  300 mg PO HS [facility]


   Stop: 01/07/19 20:59


   Last Admin: 11/11/18 21:13 Dose:  300 mg


Lorazepam (Ativan)  0.5 mg PO Q4HR PRN; Protocol


   PRN Reason: Anxiety


   Stop: 12/08/18 02:37


   Last Admin: 11/10/18 16:44 Dose:  0.5 mg


Magnesium Hydroxide (Milk Of Magnesia)  30 ml PO HS PRN


   PRN Reason: Constipation


   Stop: 01/07/19 07:56


Memantine (Namenda)  10 mg PO DAILY [facility]


   Stop: 01/07/19 08:59


   Last Admin: 11/12/18 09:17 Dose:  Not Given


Metoprolol Tartrate (Lopressor)  25 mg PO Q12HR [facility]


   Stop: 01/07/19 08:59


   Last Admin: 11/12/18 09:17 Dose:  Not Given


Quetiapine Fumarate (Seroquel)  12.5 mg PO BID [facility]; Protocol


   Stop: 01/08/19 16:59


   Last Admin: 11/12/18 09:17 Dose:  Not Given


Sodium Phosphate (Fleet Enema)  135 ml RC Q48HR PRN


   PRN Reason: Constipation


   Stop: 01/07/19 03:02


Tolterodine Tartrate (Detrol La)  4 mg PO DAILY [facility]


   Stop: 01/07/19 08:59


   Last Admin: 11/12/18 09:17 Dose:  Not Given


Zolpidem Tartrate (Ambien)  5 mg PO HS PRN


   PRN Reason: Insomnia


   Stop: 01/07/19 02:37


   Last Admin: 11/11/18 21:13 Dose:  5 mg








General: demented


HEENT: NC/AT


Neck: Supple


Lungs: CTAB


Cardiovascular: Normal S1, Normal S2


Abdomen: soft, non-tender


Extremities: clear


Neurological: no change





Internal Medicine Assmt/Plan





- Assessment


Assessment: 





severe agitation 


bipolar disease 


h/o dementia


h/o arthritis 


h/o htn


h/o hyperlipidemia








- Plan


Plan: 





as per psych


will monitor

## 2018-11-12 NOTE — PROGRESS NOTES
DATE:  11/11/2018



SUBJECTIVE:  Overnight, the patient's nursing staff is reporting that patient is

easily irritable, easily agitated.  Today on face-to-face evaluation,

disengaged, derails in conversation, and needing a lot of redirection.



MENTAL STATUS EXAMINATION:  Disorganized, delusional.



ASSESSMENT AND PLAN:  Today, the patient is delusional and easily derailed

conversations.  She is unable to formulate a safe plan.  We will continue with

the recent increase of Seroquel to target the patient's ongoing symptoms.





DD: 11/11/2018 06:34

DT: 11/12/2018 03:28

JOB# [id number]  [id number]

## 2018-11-13 RX ADMIN — ASPIRIN 81 MG SCH MG: 81 TABLET ORAL at 08:58

## 2018-11-13 RX ADMIN — FLUTICASONE PROPIONATE SCH: 50 SPRAY, METERED NASAL at 08:59

## 2018-11-13 RX ADMIN — TOLTERODINE TARTRATE SCH: 4 CAPSULE, EXTENDED RELEASE ORAL at 08:59
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
## 2018-11-13 NOTE — INTERNAL MEDICINE PROG NOTE
Internal Medicine Subjective





- Subjective


Service Date: 18


Patient is:: awake, confused


Per staff patient has:: tolerating meds





Internal Medicine Objective





- Results


Result Diagrams: 


 18 00:00





 18 00:00


Recent Labs: 


 Laboratory Last Values











WBC  8.1 Th/cmm (4.8-10.8)   18  00:00    


 


RBC  3.36 Mil/cmm (3.80-5.20)  L  18  00:00    


 


Hgb  10.1 gm/dL (12-16)  L  18  00:00    


 


Hct  29.5 % (41.0-60)  L  18  00:00    


 


MCV  87.6 fl ()   18  00:00    


 


MCH  30.0 pg (27.0-31.0)   18  00:00    


 


MCHC Differential  34.2 pg (28.0-36.0)   18  00:00    


 


RDW  16.0 % (11.5-20.0)   18  00:00    


 


Plt Count  343 Th/cmm (150-400)   18  00:00    


 


MPV  7.6 fl  18  00:00    


 


Neutrophils %  61.6 % (40.0-80.0)   18  00:00    


 


Lymphocytes %  20.6 % (20.0-50.0)   18  00:00    


 


Monocytes %  11.4 % (2.0-10.0)  H  18  00:00    


 


Eosinophils %  5.4 % (0.0-5.0)  H  18  00:00    


 


Basophils %  1.0 % (0.0-2.0)   18  00:00    


 


Sodium  132 mEq/L (136-145)  L  18  00:00    


 


Potassium  4.3 mEq/L (3.5-5.1)   18  00:00    


 


Chloride  99 mEq/L ()   18  00:00    


 


Carbon Dioxide  26.2 mEq/L (21.0-31.0)   18  00:00    


 


Anion Gap  11.1  (7.0-16.0)   18  00:00    


 


BUN  42 mg/dL (7-25)  H  18  00:00    


 


Creatinine  0.9 mg/dL (0.6-1.2)   18  00:00    


 


Est GFR ( Amer)  TNP   18  00:00    


 


Est GFR (Non-Af Amer)  TNP   18  00:00    


 


BUN/Creatinine Ratio  46.7   18  00:00    


 


Glucose  145 mg/dL ()  H  18  00:00    


 


Calcium  8.8 mg/dL (8.6-10.3)   18  00:00    


 


Total Bilirubin  0.2 mg/dL (0.3-1.0)  L  18  00:00    


 


AST  33 U/L (13-39)   18  00:00    


 


ALT  12 U/L (7-52)   18  00:00    


 


Alkaline Phosphatase  143 U/L ()  H  18  00:00    


 


Troponin I  0.02 ng/mL (0.01-0.05)   18  00:00    


 


Total Protein  6.2 gm/dL (6.0-8.3)   18  00:00    


 


Albumin  3.1 gm/dL (3.7-5.3)  L  18  00:00    


 


Globulin  3.1 gm/dL  18  00:00    


 


Albumin/Globulin Ratio  1.0  (1.0-1.8)   18  00:00    


 


Triglycerides  51 mg/dL (<150)   18  07:29    


 


Cholesterol  142 mg/dL (<200)   18  07:29    


 


LDL Cholesterol Direct  77 mg/dL ()   18  07:29    


 


HDL Cholesterol  58 mg/dL (23-92)   18  07:29    


 


TSH  2.21 uIU/ml (0.34-5.60)   18  00:00    


 


Urine Source  CATH   18  00:55    


 


Urine Color  STRAW   18  00:55    


 


Urine Clarity  CLEAR  (CLEAR)   18  00:55    


 


Urine pH  5.5  (4.6 - 8.0)   18  00:55    


 


Ur Specific Gravity  1.025  (1.005-1.030)   18  00:55    


 


Urine Protein  NEGATIVE mg/dL (NEGATIVE)   18  00:55    


 


Urine Glucose (UA)  NEGATIVE mg/dL (NEGATIVE)   18  00:55    


 


Urine Ketones  TRACE mg/dL (NEGATIVE)   18  00:55    


 


Urine Blood  NEGATIVE  (NEGATIVE)   18  00:55    


 


Urine Nitrate  NEGATIVE  (NEGATIVE)   18  00:55    


 


Urine Bilirubin  NEGATIVE  (NEGATIVE)   18  00:55    


 


Urine Urobilinogen  0.2 E.U./dL (0.2 - 1.0)   18  00:55    


 


Ur Leukocyte Esterase  NEGATIVE  (NEGATIVE)   18  00:55    


 


Urine RBC  NONE SEEN /hpf (0-5)   18  00:55    


 


Urine WBC  0-2 /hpf (0-5)   18  00:55    


 


Ur Epithelial Cells  FEW /lpf (FEW)   18  00:55    


 


Urine Bacteria  NONE SEEN /hpf (NONE SEEN)   18  00:55    


 


Salicylates  < 25.0 mg/L (30.0-100.0)  L  18  00:00    


 


Urine Opiates Screen  NEGATIVE  (NEGATIVE)   18  00:55    


 


Urine Methadone Screen  NEGATIVE  (NEGATIVE)   18  00:55    


 


Acetaminophen  < 10.0 ug/mL (10.0-30.0)  L  18  00:00    


 


Ur Barbiturates Screen  NEGATIVE  (NEGATIVE)   18  00:55    


 


Ur Tricyclics Screen  NEGATIVE  (NEGATIVE)   18  00:55    


 


Ur Phencyclidine Scrn  NEGATIVE  (NEGATIVE)   18  00:55    


 


Amphetamines Screen  NEGATIVE  (NEGATIVE)   18  00:55    


 


U Methamphetamines Scrn  NEGATIVE  (NEGATIVE)   18  00:55    


 


U Benzodiazepines Scrn  NEGATIVE  (NEGATIVE)   18  00:55    


 


U Cocaine Metab Screen  NEGATIVE  (NEGATIVE)   18  00:55    


 


U Cannabinoids Screen  NEGATIVE  (NEGATIVE)   18  00:55    


 


Ethyl Alcohol  < 10 mg/dL (0-10)   18  00:00    


 


RPR  NONREACTIVE  (NONREACTIVE)   18  00:00    














- Physical Exam


Vitals and I&O: 


 Vital Signs











Temp  97.3 F   18 20:15


 


Pulse  96   18 21:42


 


Resp  18   18 20:15


 


BP  146/73   18 21:42


 


Pulse Ox  93   18 20:15








 Intake & Output











 18





 18:59 06:59 18:59


 


Intake Total 1100 120 


 


Balance 1100 120 


 


Intake:   


 


  Oral 1100 120 


 


Other:   


 


  # Voids  2 


 


  # Bowel Movements  1 











Active Medications: 


Current Medications





Acetaminophen (Tylenol)  650 mg PO Q4HR PRN


   PRN Reason: Mild Pain / Temp above 100


   Stop: 19 02:37


Acetaminophen (Tylenol)  650 mg PO Q4HR PRN


   PRN Reason: Pain (Mild)


   Stop: 19 07:56


Al Hydrox/Mg Hydrox/Simethicone (Maalox)  30 ml PO Q4HR PRN


   PRN Reason: GI DISTRESS


   Stop: 19 02:37


Aspirin (Aspirin Chewable)  81 mg PO DAILY [facility]


   Stop: 19 08:59


   Last Admin: 18 08:58 Dose:  81 mg


Bisacodyl (Dulcolax 10 Mg Supp)  10 mg RC DAILY PRN


   PRN Reason: Constipation


   Stop: 19 03:02


Buspirone HCl (Buspar)  10 mg PO BID [facility]; Protocol


   Stop: 19 08:59


   Last Admin: 18 08:58 Dose:  10 mg


Calcium Carbonate (Os-Andreina)  500 mg PO DAILY [facility]


   Stop: 19 08:59


   Last Admin: 18 08:58 Dose:  500 mg


Cholecalciferol (Vitamin D3)  1,000 iu PO DAILY [facility]


   Stop: 19 08:59


   Last Admin: 18 08:57 Dose:  1,000 iu


Docusate Sodium (Colace)  100 mg PO BID [facility]


   Stop: 19 08:59


   Last Admin: 18 08:57 Dose:  100 mg


Donepezil HCl (Aricept)  10 mg PO HS [facility]


   Stop: 19 20:59


   Last Admin: 18 21:41 Dose:  10 mg


Famotidine (Pepcid)  20 mg PO BID [facility]


   Stop: 19 08:59


   Last Admin: 18 08:58 Dose:  20 mg


Fluticasone Propionate (Flonase)  2 spr NS DAILY [facility]


   Stop: 19 08:59


   Last Admin: 18 08:59 Dose:  Not Given


Gabapentin (Neurontin)  300 mg PO HS [facility]


   Stop: 19 20:59


   Last Admin: 18 21:41 Dose:  300 mg


Lorazepam (Ativan)  0.5 mg PO Q4HR PRN; Protocol


   PRN Reason: Anxiety


   Stop: 18 02:37


   Last Admin: 11/10/18 16:44 Dose:  0.5 mg


Magnesium Hydroxide (Milk Of Magnesia)  30 ml PO HS PRN


   PRN Reason: Constipation


   Stop: 19 07:56


Memantine (Namenda)  10 mg PO DAILY [facility]


   Stop: 19 08:59


   Last Admin: 18 08:57 Dose:  10 mg


Metoprolol Tartrate (Lopressor)  25 mg PO Q12HR [facility]


   Stop: 19 08:59


   Last Admin: 18 08:59 Dose:  Not Given


Quetiapine Fumarate (Seroquel)  25 mg PO BID [facility]; Protocol


   Stop: 19 16:59


   Last Admin: 18 08:57 Dose:  25 mg


Sodium Phosphate (Fleet Enema)  135 ml RC Q48HR PRN


   PRN Reason: Constipation


   Stop: 19 03:02


Tolterodine Tartrate (Detrol La)  4 mg PO DAILY [facility]


   Stop: 19 08:59


   Last Admin: 18 08:59 Dose:  Not Given


Zolpidem Tartrate (Ambien)  5 mg PO HS PRN


   PRN Reason: Insomnia


   Stop: 19 02:37


   Last Admin: 18 21:41 Dose:  5 mg








General: demented


HEENT: NC/AT


Neck: Supple


Lungs: CTAB


Cardiovascular: Normal S1, Normal S2


Abdomen: soft, non-tender


Extremities: clear


Neurological: no change





Internal Medicine Assmt/Plan





- Assessment


Assessment: 





severe agitation 


bipolar disease 


h/o dementia


h/o arthritis 


h/o htn


h/o hyperlipidemia





- Plan


Plan: 





cpm





Nutritional Asmnt/Malnutr-PDOC





- Dietary Evaluation


Malnutrition Findings (Please click <Entered> for more info): 








Nutritional Asmnt/Malnutrition                             Start:  18 12:

42


Text:                                                      Status: Complete    

  


Freq:                                                                          

  


Protocol:                                                                      

  


 Document     18 12:42  MIRNA  (Rec: 18 12:48  MIRNA  [id number])


 Nutritional Asmnt/Malnutrition


     Patient General Information


      Nutritional Screening                      Moderate Risk


      Diagnosis                                  psychosis NOS


      Pertinent Medical Hx/Surgical Hx           HTN, dyslipidemia, arthritis,


                                                 dementia, bipolar disorder


                                                 Per nurse note: COPD,


                                                 dermatitis, generalized muscle


                                                 weakness, atherosclerotic


                                                 heart disease, age related


                                                 osteoporosis, PVD,


                                                 polyneuropathy, Alzheimer's


                                                 disease


      Subjective Information                     Pt sleeping at time of visit.


                                                 Nursing noted PO intake: 75-


                                                 100%.


      Current Diet Order/ Nutrition Support      ANNE; ensure pudding TID


      Pertinent Medications                      Os-andreina, vit D3, colace, pepcid


                                                 , seroquel


      Pertinent Labs                             : Na 132, BUN 42, glucose


                                                 145, Alb 3.1


     Nutritional Hx/Data


      Height                                     5 ft 6 in


      Height (Calculated Centimeters)            167.6


      Current Weight (lbs)                       135 lb


      Weight (Calculated Kilograms)              61.2


      Weight (Calculated Grams)                  20693.0


      Ideal Body Weight                          130 lb


      Body Mass Index (BMI)                      21.7


      Weight Status                              Approriate


     GI Symptoms


      GI Symptoms                                None


      Last BM                                    


      Difficult in:                              None


      Food Allergies                             No


      Skin Integrity/Comment:                    itching, intact, aren 15


      Current %PO                                Good (%)


     Estimated Nutritional Goals


      BEE in Kcals:                              Using Current wt


      Calories/Kcals/Kg                          25-30


      Kcals Calculated                           4958-9414


      Protein:                                   Using Current wt


      Protein g/k.0


      Protein Calculated                         61 g


      Fluid: ml                                  [id number] (1 ml/kcal)


     Nutritional Problem


      1. Problem


       Problem                                   no nutrition dx at this time


     Malnutrition Alert


      Is there a minimum of two criteria         No


       selected?                                 


       Query Text:Check all the applicable       


       criteria. A minimum of two criteria are   


       recommended for diagnosis of either       


       severe or non-severe malnutrition.        


     Malnutrition Related to Morbid Obesity


      Malnutrition related to morbid obesity     No


     Intervention/Recommendation


      Comments                                   1. Continue with ANNE diet and


                                                 ensure pudding TID as ordered


                                                 2. Consider starting CCHO diet


                                                 if blood glucose continues to


                                                 stay above normal limits


                                                 3. Monitor PO intake, wt, labs


                                                 and skin integrity


                                                 4. F/U as moderate risk in 3-5


                                                 days, 11/15-


     Expected Outcomes/Goals


      Expected Outcomes/Goals                    1. PO intake to continue


                                                 meeting at least 75% of all


                                                 meals


                                                 2. Wt stability, skin to


                                                 remain intact, and nutrition


                                                 related labs to approach


                                                 normal limits


                                                 Reviewed by Brianna Diana RD

## 2018-11-14 RX ADMIN — FLUTICASONE PROPIONATE SCH: 50 SPRAY, METERED NASAL at 09:10

## 2018-11-14 RX ADMIN — ASPIRIN 81 MG SCH MG: 81 TABLET ORAL at 09:09

## 2018-11-14 RX ADMIN — TOLTERODINE TARTRATE SCH MG: 4 CAPSULE, EXTENDED RELEASE ORAL at 09:10

## 2018-11-14 NOTE — INTERNAL MEDICINE PROG NOTE
Internal Medicine Subjective





- Subjective


Patient seen and examined:: chart reviewed


Patient is:: awake, other (still confused)


Per staff patient has:: no adverse event, tolerating meds





Internal Medicine Objective





- Results


Result Diagrams: 


 18 00:00





 18 00:00


Recent Labs: 


 Laboratory Last Values











WBC  8.1 Th/cmm (4.8-10.8)   18  00:00    


 


RBC  3.36 Mil/cmm (3.80-5.20)  L  18  00:00    


 


Hgb  10.1 gm/dL (12-16)  L  18  00:00    


 


Hct  29.5 % (41.0-60)  L  18  00:00    


 


MCV  87.6 fl ()   18  00:00    


 


MCH  30.0 pg (27.0-31.0)   18  00:00    


 


MCHC Differential  34.2 pg (28.0-36.0)   18  00:00    


 


RDW  16.0 % (11.5-20.0)   18  00:00    


 


Plt Count  343 Th/cmm (150-400)   18  00:00    


 


MPV  7.6 fl  18  00:00    


 


Neutrophils %  61.6 % (40.0-80.0)   18  00:00    


 


Lymphocytes %  20.6 % (20.0-50.0)   18  00:00    


 


Monocytes %  11.4 % (2.0-10.0)  H  18  00:00    


 


Eosinophils %  5.4 % (0.0-5.0)  H  18  00:00    


 


Basophils %  1.0 % (0.0-2.0)   18  00:00    


 


Sodium  132 mEq/L (136-145)  L  18  00:00    


 


Potassium  4.3 mEq/L (3.5-5.1)   18  00:00    


 


Chloride  99 mEq/L ()   18  00:00    


 


Carbon Dioxide  26.2 mEq/L (21.0-31.0)   18  00:00    


 


Anion Gap  11.1  (7.0-16.0)   18  00:00    


 


BUN  42 mg/dL (7-25)  H  18  00:00    


 


Creatinine  0.9 mg/dL (0.6-1.2)   18  00:00    


 


Est GFR ( Amer)  TNP   18  00:00    


 


Est GFR (Non-Af Amer)  TNP   18  00:00    


 


BUN/Creatinine Ratio  46.7   18  00:00    


 


Glucose  145 mg/dL ()  H  18  00:00    


 


Calcium  8.8 mg/dL (8.6-10.3)   18  00:00    


 


Total Bilirubin  0.2 mg/dL (0.3-1.0)  L  18  00:00    


 


AST  33 U/L (13-39)   18  00:00    


 


ALT  12 U/L (7-52)   18  00:00    


 


Alkaline Phosphatase  143 U/L ()  H  18  00:00    


 


Troponin I  0.02 ng/mL (0.01-0.05)   18  00:00    


 


Total Protein  6.2 gm/dL (6.0-8.3)   18  00:00    


 


Albumin  3.1 gm/dL (3.7-5.3)  L  18  00:00    


 


Globulin  3.1 gm/dL  18  00:00    


 


Albumin/Globulin Ratio  1.0  (1.0-1.8)   18  00:00    


 


Triglycerides  51 mg/dL (<150)   18  07:29    


 


Cholesterol  142 mg/dL (<200)   18  07:29    


 


LDL Cholesterol Direct  77 mg/dL ()   18  07:29    


 


HDL Cholesterol  58 mg/dL (23-92)   18  07:29    


 


TSH  2.21 uIU/ml (0.34-5.60)   18  00:00    


 


Urine Source  CATH   18  00:55    


 


Urine Color  STRAW   18  00:55    


 


Urine Clarity  CLEAR  (CLEAR)   18  00:55    


 


Urine pH  5.5  (4.6 - 8.0)   18  00:55    


 


Ur Specific Gravity  1.025  (1.005-1.030)   18  00:55    


 


Urine Protein  NEGATIVE mg/dL (NEGATIVE)   18  00:55    


 


Urine Glucose (UA)  NEGATIVE mg/dL (NEGATIVE)   18  00:55    


 


Urine Ketones  TRACE mg/dL (NEGATIVE)   18  00:55    


 


Urine Blood  NEGATIVE  (NEGATIVE)   18  00:55    


 


Urine Nitrate  NEGATIVE  (NEGATIVE)   18  00:55    


 


Urine Bilirubin  NEGATIVE  (NEGATIVE)   18  00:55    


 


Urine Urobilinogen  0.2 E.U./dL (0.2 - 1.0)   18  00:55    


 


Ur Leukocyte Esterase  NEGATIVE  (NEGATIVE)   18  00:55    


 


Urine RBC  NONE SEEN /hpf (0-5)   18  00:55    


 


Urine WBC  0-2 /hpf (0-5)   18  00:55    


 


Ur Epithelial Cells  FEW /lpf (FEW)   18  00:55    


 


Urine Bacteria  NONE SEEN /hpf (NONE SEEN)   18  00:55    


 


Salicylates  < 25.0 mg/L (30.0-100.0)  L  18  00:00    


 


Urine Opiates Screen  NEGATIVE  (NEGATIVE)   18  00:55    


 


Urine Methadone Screen  NEGATIVE  (NEGATIVE)   18  00:55    


 


Acetaminophen  < 10.0 ug/mL (10.0-30.0)  L  18  00:00    


 


Ur Barbiturates Screen  NEGATIVE  (NEGATIVE)   18  00:55    


 


Ur Tricyclics Screen  NEGATIVE  (NEGATIVE)   18  00:55    


 


Ur Phencyclidine Scrn  NEGATIVE  (NEGATIVE)   18  00:55    


 


Amphetamines Screen  NEGATIVE  (NEGATIVE)   18  00:55    


 


U Methamphetamines Scrn  NEGATIVE  (NEGATIVE)   18  00:55    


 


U Benzodiazepines Scrn  NEGATIVE  (NEGATIVE)   18  00:55    


 


U Cocaine Metab Screen  NEGATIVE  (NEGATIVE)   18  00:55    


 


U Cannabinoids Screen  NEGATIVE  (NEGATIVE)   18  00:55    


 


Ethyl Alcohol  < 10 mg/dL (0-10)   18  00:00    


 


RPR  NONREACTIVE  (NONREACTIVE)   18  00:00    














- Physical Exam


Vitals and I&O: 


 Vital Signs











Temp  97.6 F   18 05:50


 


Pulse  110   18 05:50


 


Resp  18   18 05:50


 


BP  142/77   18 05:50


 


Pulse Ox  90   18 05:50








 Intake & Output











 18





 18:59 06:59 18:59


 


Intake Total  180 


 


Balance  180 


 


Intake:   


 


  Oral  180 


 


Other:   


 


  # Voids  2 


 


  # Bowel Movements  1 











Active Medications: 


Current Medications





Acetaminophen (Tylenol)  650 mg PO Q4HR PRN


   PRN Reason: Mild Pain / Temp above 100


   Stop: 19 02:37


Al Hydrox/Mg Hydrox/Simethicone (Maalox)  30 ml PO Q4HR PRN


   PRN Reason: GI DISTRESS


   Stop: 19 02:37


Aspirin (Aspirin Chewable)  81 mg PO DAILY [facility]


   Stop: 19 08:59


   Last Admin: 18 09:09 Dose:  81 mg


Bisacodyl (Dulcolax 10 Mg Supp)  10 mg RC DAILY PRN


   PRN Reason: Constipation


   Stop: 19 03:02


Buspirone HCl (Buspar)  10 mg PO BID [facility]; Protocol


   Stop: 19 08:59


   Last Admin: 18 09:09 Dose:  10 mg


Calcium Carbonate (Os-Andreina)  500 mg PO DAILY [facility]


   Stop: 19 08:59


   Last Admin: 18 09:09 Dose:  500 mg


Cholecalciferol (Vitamin D3)  1,000 iu PO DAILY [facility]


   Stop: 19 08:59


   Last Admin: 18 09:10 Dose:  1,000 iu


Docusate Sodium (Colace)  100 mg PO BID [facility]


   Stop: 19 08:59


   Last Admin: 18 09:09 Dose:  100 mg


Donepezil HCl (Aricept)  10 mg PO HS [facility]


   Stop: 19 20:59


   Last Admin: 18 21:47 Dose:  Not Given


Famotidine (Pepcid)  20 mg PO BID [facility]


   Stop: 19 08:59


   Last Admin: 18 09:10 Dose:  20 mg


Fluticasone Propionate (Flonase)  2 spr NS DAILY [facility]


   Stop: 19 08:59


   Last Admin: 18 09:10 Dose:  Not Given


Gabapentin (Neurontin)  300 mg PO HS [facility]


   Stop: 19 20:59


   Last Admin: 18 21:47 Dose:  Not Given


Lorazepam (Ativan)  0.5 mg PO Q4HR PRN; Protocol


   PRN Reason: Anxiety


   Stop: 18 02:37


   Last Admin: 11/10/18 16:44 Dose:  0.5 mg


Magnesium Hydroxide (Milk Of Magnesia)  30 ml PO HS PRN


   PRN Reason: Constipation


   Stop: 19 07:56


Memantine (Namenda)  10 mg PO DAILY [facility]


   Stop: 19 08:59


   Last Admin: 18 09:09 Dose:  10 mg


Metoprolol Tartrate (Lopressor)  25 mg PO Q12HR [facility]


   Stop: 19 08:59


   Last Admin: 18 09:10 Dose:  Not Given


Quetiapine Fumarate (Seroquel)  25 mg PO BID [facility]; Protocol


   Stop: 19 16:59


   Last Admin: 18 09:10 Dose:  25 mg


Sodium Phosphate (Fleet Enema)  135 ml RC Q48HR PRN


   PRN Reason: Constipation


   Stop: 19 03:02


Tolterodine Tartrate (Detrol La)  4 mg PO DAILY [facility]


   Stop: 19 08:59


   Last Admin: 18 09:10 Dose:  4 mg


Zolpidem Tartrate (Ambien)  5 mg PO HS PRN


   PRN Reason: Insomnia


   Stop: 19 02:37


   Last Admin: 18 21:41 Dose:  5 mg








General: demented


HEENT: NC/AT


Neck: Supple


Lungs: CTAB


Cardiovascular: Normal S1, Normal S2


Abdomen: soft, non-tender


Extremities: clear


Neurological: no change





Internal Medicine Assmt/Plan





- Assessment


Assessment: 





severe agitation 


bipolar disease 


h/o dementia


h/o arthritis 


h/o htn


h/o hyperlipidemia








- Plan


Plan: 





as per psych


will monitor 





Nutritional Asmnt/Malnutr-PDOC





- Dietary Evaluation


Malnutrition Findings (Please click <Entered> for more info): 








Nutritional Asmnt/Malnutrition                             Start:  18 12:

42


Text:                                                      Status: Complete    

  


Freq:                                                                          

  


Protocol:                                                                      

  


 Document     11/12/18 12:42  MIRNA  (Rec: 18 12:48  MIRNA  [id number])


 Nutritional Asmnt/Malnutrition


     Patient General Information


      Nutritional Screening                      Moderate Risk


      Diagnosis                                  psychosis NOS


      Pertinent Medical Hx/Surgical Hx           HTN, dyslipidemia, arthritis,


                                                 dementia, bipolar disorder


                                                 Per nurse note: COPD,


                                                 dermatitis, generalized muscle


                                                 weakness, atherosclerotic


                                                 heart disease, age related


                                                 osteoporosis, PVD,


                                                 polyneuropathy, Alzheimer's


                                                 disease


      Subjective Information                     Pt sleeping at time of visit.


                                                 Nursing noted PO intake: 75-


                                                 100%.


      Current Diet Order/ Nutrition Support      ANNE; ensure pudding TID


      Pertinent Medications                      Os-andreina, vit D3, colace, pepcid


                                                 , seroquel


      Pertinent Labs                             : Na 132, BUN 42, glucose


                                                 145, Alb 3.1


     Nutritional Hx/Data


      Height                                     1.68 m


      Height (Calculated Centimeters)            167.6


      Current Weight (lbs)                       61.235 kg


      Weight (Calculated Kilograms)              61.2


      Weight (Calculated Grams)                  91915.0


      Ideal Body Weight                          130 lb


      Body Mass Index (BMI)                      21.7


      Weight Status                              Approriate


     GI Symptoms


      GI Symptoms                                None


      Last BM                                    


      Difficult in:                              None


      Food Allergies                             No


      Skin Integrity/Comment:                    itching, intact, aren 15


      Current %PO                                Good (%)


     Estimated Nutritional Goals


      BEE in Kcals:                              Using Current wt


      Calories/Kcals/Kg                          25-30


      Kcals Calculated                           4594-8100


      Protein:                                   Using Current wt


      Protein g/k.0


      Protein Calculated                         61 g


      Fluid: ml                                  9744-1813 (1 ml/kcal)


     Nutritional Problem


      1. Problem


       Problem                                   no nutrition dx at this time


     Malnutrition Alert


      Is there a minimum of two criteria         No


       selected?                                 


       Query Text:Check all the applicable       


       criteria. A minimum of two criteria are   


       recommended for diagnosis of either       


       severe or non-severe malnutrition.        


     Malnutrition Related to Morbid Obesity


      Malnutrition related to morbid obesity     No


     Intervention/Recommendation


      Comments                                   1. Continue with ANNE diet and


                                                 ensure pudding TID as ordered


                                                 2. Consider starting CCHO diet


                                                 if blood glucose continues to


                                                 stay above normal limits


                                                 3. Monitor PO intake, wt, labs


                                                 and skin integrity


                                                 4. F/U as moderate risk in 3-5


                                                 days, 11/15-


     Expected Outcomes/Goals


      Expected Outcomes/Goals                    1. PO intake to continue


                                                 meeting at least 75% of all


                                                 meals


                                                 2. Wt stability, skin to


                                                 remain intact, and nutrition


                                                 related labs to approach


                                                 normal limits


                                                 Reviewed by Brianna Diana RD

## 2018-11-15 RX ADMIN — ASPIRIN 81 MG SCH MG: 81 TABLET ORAL at 09:26

## 2018-11-15 RX ADMIN — FLUTICASONE PROPIONATE SCH SPR: 50 SPRAY, METERED NASAL at 09:25

## 2018-11-15 RX ADMIN — TOLTERODINE TARTRATE SCH MG: 4 CAPSULE, EXTENDED RELEASE ORAL at 09:24

## 2018-11-15 NOTE — INTERNAL MEDICINE PROG NOTE
Internal Medicine Subjective





- Subjective


Service Date: 11/15/18 (noted with bilateral upper extremity rashes patient c/o 

itchiness)


Patient is:: awake, other (still confused)


Per staff patient has:: no adverse event, tolerating meds





Internal Medicine Objective





- Results


Result Diagrams: 


 18 00:00





 18 00:00


Recent Labs: 


 Laboratory Last Values











WBC  8.1 Th/cmm (4.8-10.8)   18  00:00    


 


RBC  3.36 Mil/cmm (3.80-5.20)  L  18  00:00    


 


Hgb  10.1 gm/dL (12-16)  L  18  00:00    


 


Hct  29.5 % (41.0-60)  L  18  00:00    


 


MCV  87.6 fl ()   18  00:00    


 


MCH  30.0 pg (27.0-31.0)   18  00:00    


 


MCHC Differential  34.2 pg (28.0-36.0)   18  00:00    


 


RDW  16.0 % (11.5-20.0)   18  00:00    


 


Plt Count  343 Th/cmm (150-400)   18  00:00    


 


MPV  7.6 fl  18  00:00    


 


Neutrophils %  61.6 % (40.0-80.0)   18  00:00    


 


Lymphocytes %  20.6 % (20.0-50.0)   18  00:00    


 


Monocytes %  11.4 % (2.0-10.0)  H  18  00:00    


 


Eosinophils %  5.4 % (0.0-5.0)  H  18  00:00    


 


Basophils %  1.0 % (0.0-2.0)   18  00:00    


 


Sodium  132 mEq/L (136-145)  L  18  00:00    


 


Potassium  4.3 mEq/L (3.5-5.1)   18  00:00    


 


Chloride  99 mEq/L ()   18  00:00    


 


Carbon Dioxide  26.2 mEq/L (21.0-31.0)   18  00:00    


 


Anion Gap  11.1  (7.0-16.0)   18  00:00    


 


BUN  42 mg/dL (7-25)  H  18  00:00    


 


Creatinine  0.9 mg/dL (0.6-1.2)   18  00:00    


 


Est GFR ( Amer)  TNP   18  00:00    


 


Est GFR (Non-Af Amer)  TNP   18  00:00    


 


BUN/Creatinine Ratio  46.7   18  00:00    


 


Glucose  145 mg/dL ()  H  18  00:00    


 


Calcium  8.8 mg/dL (8.6-10.3)   18  00:00    


 


Total Bilirubin  0.2 mg/dL (0.3-1.0)  L  18  00:00    


 


AST  33 U/L (13-39)   18  00:00    


 


ALT  12 U/L (7-52)   18  00:00    


 


Alkaline Phosphatase  143 U/L ()  H  18  00:00    


 


Troponin I  0.02 ng/mL (0.01-0.05)   18  00:00    


 


Total Protein  6.2 gm/dL (6.0-8.3)   18  00:00    


 


Albumin  3.1 gm/dL (3.7-5.3)  L  18  00:00    


 


Globulin  3.1 gm/dL  18  00:00    


 


Albumin/Globulin Ratio  1.0  (1.0-1.8)   18  00:00    


 


Triglycerides  51 mg/dL (<150)   18  07:29    


 


Cholesterol  142 mg/dL (<200)   18  07:29    


 


LDL Cholesterol Direct  77 mg/dL ()   18  07:29    


 


HDL Cholesterol  58 mg/dL (23-92)   18  07:29    


 


TSH  2.21 uIU/ml (0.34-5.60)   18  00:00    


 


Urine Source  CATH   18  00:55    


 


Urine Color  STRAW   18  00:55    


 


Urine Clarity  CLEAR  (CLEAR)   18  00:55    


 


Urine pH  5.5  (4.6 - 8.0)   18  00:55    


 


Ur Specific Gravity  1.025  (1.005-1.030)   18  00:55    


 


Urine Protein  NEGATIVE mg/dL (NEGATIVE)   18  00:55    


 


Urine Glucose (UA)  NEGATIVE mg/dL (NEGATIVE)   18  00:55    


 


Urine Ketones  TRACE mg/dL (NEGATIVE)   18  00:55    


 


Urine Blood  NEGATIVE  (NEGATIVE)   18  00:55    


 


Urine Nitrate  NEGATIVE  (NEGATIVE)   18  00:55    


 


Urine Bilirubin  NEGATIVE  (NEGATIVE)   18  00:55    


 


Urine Urobilinogen  0.2 E.U./dL (0.2 - 1.0)   18  00:55    


 


Ur Leukocyte Esterase  NEGATIVE  (NEGATIVE)   18  00:55    


 


Urine RBC  NONE SEEN /hpf (0-5)   18  00:55    


 


Urine WBC  0-2 /hpf (0-5)   18  00:55    


 


Ur Epithelial Cells  FEW /lpf (FEW)   18  00:55    


 


Urine Bacteria  NONE SEEN /hpf (NONE SEEN)   18  00:55    


 


Salicylates  < 25.0 mg/L (30.0-100.0)  L  18  00:00    


 


Urine Opiates Screen  NEGATIVE  (NEGATIVE)   18  00:55    


 


Urine Methadone Screen  NEGATIVE  (NEGATIVE)   18  00:55    


 


Acetaminophen  < 10.0 ug/mL (10.0-30.0)  L  18  00:00    


 


Ur Barbiturates Screen  NEGATIVE  (NEGATIVE)   18  00:55    


 


Ur Tricyclics Screen  NEGATIVE  (NEGATIVE)   18  00:55    


 


Ur Phencyclidine Scrn  NEGATIVE  (NEGATIVE)   18  00:55    


 


Amphetamines Screen  NEGATIVE  (NEGATIVE)   18  00:55    


 


U Methamphetamines Scrn  NEGATIVE  (NEGATIVE)   18  00:55    


 


U Benzodiazepines Scrn  NEGATIVE  (NEGATIVE)   18  00:55    


 


U Cocaine Metab Screen  NEGATIVE  (NEGATIVE)   18  00:55    


 


U Cannabinoids Screen  NEGATIVE  (NEGATIVE)   18  00:55    


 


Ethyl Alcohol  < 10 mg/dL (0-10)   18  00:00    


 


RPR  NONREACTIVE  (NONREACTIVE)   18  00:00    














- Physical Exam


Vitals and I&O: 


 Vital Signs











Temp  97.1 F   11/15/18 06:51


 


Pulse  102   11/15/18 09:24


 


Resp  18   11/15/18 06:51


 


BP  105/64   11/15/18 09:24


 


Pulse Ox  96   11/15/18 06:51








 Intake & Output











 11/14/18 11/15/18 11/15/18





 18:59 06:59 18:59


 


Intake Total 700 120 


 


Balance 700 120 


 


Intake:   


 


  Oral 700 120 


 


Other:   


 


  # Voids 2 3 


 


  # Bowel Movements 0  











Active Medications: 


Current Medications





Acetaminophen (Tylenol)  650 mg PO Q4HR PRN


   PRN Reason: Mild Pain / Temp above 100


   Stop: 19 02:37


Al Hydrox/Mg Hydrox/Simethicone (Maalox)  30 ml PO Q4HR PRN


   PRN Reason: GI DISTRESS


   Stop: 19 02:37


Aspirin (Aspirin Chewable)  81 mg PO DAILY [facility]


   Stop: 19 08:59


   Last Admin: 11/15/18 09:26 Dose:  81 mg


Bisacodyl (Dulcolax 10 Mg Supp)  10 mg RC DAILY PRN


   PRN Reason: Constipation


   Stop: 19 03:02


Buspirone HCl (Buspar)  10 mg PO BID [facility]; Protocol


   Stop: 19 08:59


   Last Admin: 11/15/18 09:26 Dose:  10 mg


Calcium Carbonate (Os-Andreina)  500 mg PO DAILY [facility]


   Stop: 19 08:59


   Last Admin: 11/15/18 09:26 Dose:  500 mg


Cholecalciferol (Vitamin D3)  1,000 iu PO DAILY [facility]


   Stop: 19 08:59


   Last Admin: 11/15/18 09:26 Dose:  1,000 iu


Docusate Sodium (Colace)  100 mg PO BID [facility]


   Stop: 19 08:59


   Last Admin: 11/15/18 09:26 Dose:  100 mg


Donepezil HCl (Aricept)  10 mg PO HS [facility]


   Stop: 19 20:59


   Last Admin: 18 20:32 Dose:  10 mg


Famotidine (Pepcid)  20 mg PO BID [facility]


   Stop: 19 08:59


   Last Admin: 11/15/18 09:26 Dose:  20 mg


Fluticasone Propionate (Flonase)  2 spr NS DAILY [facility]


   Stop: 19 08:59


   Last Admin: 11/15/18 09:25 Dose:  1 spr


Gabapentin (Neurontin)  300 mg PO HS [facility]


   Stop: 19 20:59


   Last Admin: 18 20:31 Dose:  300 mg


Lorazepam (Ativan)  0.5 mg PO Q4HR PRN; Protocol


   PRN Reason: Anxiety


   Stop: 18 02:37


   Last Admin: 11/10/18 16:44 Dose:  0.5 mg


Magnesium Hydroxide (Milk Of Magnesia)  30 ml PO HS PRN


   PRN Reason: Constipation


   Stop: 19 07:56


Memantine (Namenda)  10 mg PO DAILY [facility]


   Stop: 19 08:59


   Last Admin: 11/15/18 09:26 Dose:  10 mg


Metoprolol Tartrate (Lopressor)  25 mg PO Q12HR [facility]


   Stop: 19 08:59


   Last Admin: 11/15/18 09:24 Dose:  25 mg


Quetiapine Fumarate (Seroquel)  25 mg PO BID [facility]; Protocol


   Stop: 19 16:59


   Last Admin: 11/15/18 09:26 Dose:  25 mg


Sodium Phosphate (Fleet Enema)  135 ml RC Q48HR PRN


   PRN Reason: Constipation


   Stop: 19 03:02


Tolterodine Tartrate (Detrol La)  4 mg PO DAILY [facility]


   Stop: 19 08:59


   Last Admin: 11/15/18 09:24 Dose:  4 mg


Zolpidem Tartrate (Ambien)  5 mg PO HS PRN


   PRN Reason: Insomnia


   Stop: 19 02:37


   Last Admin: 18 21:41 Dose:  5 mg








General: demented


HEENT: NC/AT


Neck: Supple


Lungs: CTAB


Cardiovascular: Normal S1, Normal S2


Abdomen: soft, non-tender


Extremities: clear


Neurological: no change





Internal Medicine Assmt/Plan





- Assessment


Assessment: 


bilateral upper ext rashes 


severe agitation 


bipolar disease 


h/o dementia


h/o arthritis 


h/o htn


h/o hyperlipidemia





- Plan


Plan: 


elimite x1 prophylactic. 


cpm





Nutritional Asmnt/Malnutr-PDOC





- Dietary Evaluation


Malnutrition Findings (Please click <Entered> for more info): 








Nutritional Asmnt/Malnutrition                             Start:  18 12:

42


Text:                                                      Status: Complete    

  


Freq:                                                                          

  


Protocol:                                                                      

  


 Document     18 12:42  MIRNA  (Rec: 18 12:48  MIRNA  [id number])


 Nutritional Asmnt/Malnutrition


     Patient General Information


      Nutritional Screening                      Moderate Risk


      Diagnosis                                  psychosis NOS


      Pertinent Medical Hx/Surgical Hx           HTN, dyslipidemia, arthritis,


                                                 dementia, bipolar disorder


                                                 Per nurse note: COPD,


                                                 dermatitis, generalized muscle


                                                 weakness, atherosclerotic


                                                 heart disease, age related


                                                 osteoporosis, PVD,


                                                 polyneuropathy, Alzheimer's


                                                 disease


      Subjective Information                     Pt sleeping at time of visit.


                                                 Nursing noted PO intake: 75-


                                                 100%.


      Current Diet Order/ Nutrition Support      ANNE; ensure pudding TID


      Pertinent Medications                      Os-andreina, vit D3, colace, pepcid


                                                 , seroquel


      Pertinent Labs                             : Na 132, BUN 42, glucose


                                                 145, Alb 3.1


     Nutritional Hx/Data


      Height                                     5 ft 6 in


      Height (Calculated Centimeters)            167.6


      Current Weight (lbs)                       135 lb


      Weight (Calculated Kilograms)              61.2


      Weight (Calculated Grams)                  82923.0


      Ideal Body Weight                          130 lb


      Body Mass Index (BMI)                      21.7


      Weight Status                              Approriate


     GI Symptoms


      GI Symptoms                                None


      Last BM                                    


      Difficult in:                              None


      Food Allergies                             No


      Skin Integrity/Comment:                    itching, intact, aren 15


      Current %PO                                Good (%)


     Estimated Nutritional Goals


      BEE in Kcals:                              Using Current wt


      Calories/Kcals/Kg                          25-30


      Kcals Calculated                           9268-5229


      Protein:                                   Using Current wt


      Protein g/k.0


      Protein Calculated                         61 g


      Fluid: ml                                  [id number] (1 ml/kcal)


     Nutritional Problem


      1. Problem


       Problem                                   no nutrition dx at this time


     Malnutrition Alert


      Is there a minimum of two criteria         No


       selected?                                 


       Query Text:Check all the applicable       


       criteria. A minimum of two criteria are   


       recommended for diagnosis of either       


       severe or non-severe malnutrition.        


     Malnutrition Related to Morbid Obesity


      Malnutrition related to morbid obesity     No


     Intervention/Recommendation


      Comments                                   1. Continue with ANNE diet and


                                                 ensure pudding TID as ordered


                                                 2. Consider starting CCHO diet


                                                 if blood glucose continues to


                                                 stay above normal limits


                                                 3. Monitor PO intake, wt, labs


                                                 and skin integrity


                                                 4. F/U as moderate risk in 3-5


                                                 days, 11/15-


     Expected Outcomes/Goals


      Expected Outcomes/Goals                    1. PO intake to continue


                                                 meeting at least 75% of all


                                                 meals


                                                 2. Wt stability, skin to


                                                 remain intact, and nutrition


                                                 related labs to approach


                                                 normal limits


                                                 Reviewed by Brianna Diana RD

## 2018-11-16 RX ADMIN — TOLTERODINE TARTRATE SCH: 4 CAPSULE, EXTENDED RELEASE ORAL at 10:46

## 2018-11-16 RX ADMIN — FLUTICASONE PROPIONATE SCH: 50 SPRAY, METERED NASAL at 10:43

## 2018-11-16 RX ADMIN — ASPIRIN 81 MG SCH: 81 TABLET ORAL at 10:42

## 2018-11-16 NOTE — INTERNAL MEDICINE PROG NOTE
Internal Medicine Subjective





- Subjective


Patient seen and examined:: chart reviewed


Patient is:: awake, other (still confused, psychotic )


Per staff patient has:: no adverse event, tolerating meds





Internal Medicine Objective





- Results


Result Diagrams: 


 18 00:00





 18 00:00


Recent Labs: 


 Laboratory Last Values











WBC  8.1 Th/cmm (4.8-10.8)   18  00:00    


 


RBC  3.36 Mil/cmm (3.80-5.20)  L  18  00:00    


 


Hgb  10.1 gm/dL (12-16)  L  18  00:00    


 


Hct  29.5 % (41.0-60)  L  18  00:00    


 


MCV  87.6 fl ()   18  00:00    


 


MCH  30.0 pg (27.0-31.0)   18  00:00    


 


MCHC Differential  34.2 pg (28.0-36.0)   18  00:00    


 


RDW  16.0 % (11.5-20.0)   18  00:00    


 


Plt Count  343 Th/cmm (150-400)   18  00:00    


 


MPV  7.6 fl  18  00:00    


 


Neutrophils %  61.6 % (40.0-80.0)   18  00:00    


 


Lymphocytes %  20.6 % (20.0-50.0)   18  00:00    


 


Monocytes %  11.4 % (2.0-10.0)  H  18  00:00    


 


Eosinophils %  5.4 % (0.0-5.0)  H  18  00:00    


 


Basophils %  1.0 % (0.0-2.0)   18  00:00    


 


Sodium  132 mEq/L (136-145)  L  18  00:00    


 


Potassium  4.3 mEq/L (3.5-5.1)   18  00:00    


 


Chloride  99 mEq/L ()   18  00:00    


 


Carbon Dioxide  26.2 mEq/L (21.0-31.0)   18  00:00    


 


Anion Gap  11.1  (7.0-16.0)   18  00:00    


 


BUN  42 mg/dL (7-25)  H  18  00:00    


 


Creatinine  0.9 mg/dL (0.6-1.2)   18  00:00    


 


Est GFR ( Amer)  TNP   11  00:00    


 


Est GFR (Non-Af Amer)  TNP   18  00:00    


 


BUN/Creatinine Ratio  46.7   18  00:00    


 


Glucose  145 mg/dL ()  H  18  00:00    


 


Calcium  8.8 mg/dL (8.6-10.3)   18  00:00    


 


Total Bilirubin  0.2 mg/dL (0.3-1.0)  L  18  00:00    


 


AST  33 U/L (13-39)   18  00:00    


 


ALT  12 U/L (7-52)   18  00:00    


 


Alkaline Phosphatase  143 U/L ()  H  18  00:00    


 


Troponin I  0.02 ng/mL (0.01-0.05)   18  00:00    


 


Total Protein  6.2 gm/dL (6.0-8.3)   18  00:00    


 


Albumin  3.1 gm/dL (3.7-5.3)  L  18  00:00    


 


Globulin  3.1 gm/dL  18  00:00    


 


Albumin/Globulin Ratio  1.0  (1.0-1.8)   18  00:00    


 


Triglycerides  51 mg/dL (<150)   18  07:29    


 


Cholesterol  142 mg/dL (<200)   18  07:29    


 


LDL Cholesterol Direct  77 mg/dL ()   18  07:29    


 


HDL Cholesterol  58 mg/dL (23-92)   18  07:29    


 


TSH  2.21 uIU/ml (0.34-5.60)   18  00:00    


 


Urine Source  CATH   18  00:55    


 


Urine Color  STRAW   18  00:55    


 


Urine Clarity  CLEAR  (CLEAR)   18  00:55    


 


Urine pH  5.5  (4.6 - 8.0)   18  00:55    


 


Ur Specific Gravity  1.025  (1.005-1.030)   18  00:55    


 


Urine Protein  NEGATIVE mg/dL (NEGATIVE)   18  00:55    


 


Urine Glucose (UA)  NEGATIVE mg/dL (NEGATIVE)   18  00:55    


 


Urine Ketones  TRACE mg/dL (NEGATIVE)   18  00:55    


 


Urine Blood  NEGATIVE  (NEGATIVE)   18  00:55    


 


Urine Nitrate  NEGATIVE  (NEGATIVE)   18  00:55    


 


Urine Bilirubin  NEGATIVE  (NEGATIVE)   18  00:55    


 


Urine Urobilinogen  0.2 E.U./dL (0.2 - 1.0)   18  00:55    


 


Ur Leukocyte Esterase  NEGATIVE  (NEGATIVE)   18  00:55    


 


Urine RBC  NONE SEEN /hpf (0-5)   18  00:55    


 


Urine WBC  0-2 /hpf (0-5)   18  00:55    


 


Ur Epithelial Cells  FEW /lpf (FEW)   18  00:55    


 


Urine Bacteria  NONE SEEN /hpf (NONE SEEN)   18  00:55    


 


Salicylates  < 25.0 mg/L (30.0-100.0)  L  18  00:00    


 


Urine Opiates Screen  NEGATIVE  (NEGATIVE)   18  00:55    


 


Urine Methadone Screen  NEGATIVE  (NEGATIVE)   18  00:55    


 


Acetaminophen  < 10.0 ug/mL (10.0-30.0)  L  18  00:00    


 


Ur Barbiturates Screen  NEGATIVE  (NEGATIVE)   18  00:55    


 


Ur Tricyclics Screen  NEGATIVE  (NEGATIVE)   18  00:55    


 


Ur Phencyclidine Scrn  NEGATIVE  (NEGATIVE)   18  00:55    


 


Amphetamines Screen  NEGATIVE  (NEGATIVE)   18  00:55    


 


U Methamphetamines Scrn  NEGATIVE  (NEGATIVE)   18  00:55    


 


U Benzodiazepines Scrn  NEGATIVE  (NEGATIVE)   18  00:55    


 


U Cocaine Metab Screen  NEGATIVE  (NEGATIVE)   18  00:55    


 


U Cannabinoids Screen  NEGATIVE  (NEGATIVE)   18  00:55    


 


Ethyl Alcohol  < 10 mg/dL (0-10)   18  00:00    


 


RPR  NONREACTIVE  (NONREACTIVE)   18  00:00    














- Physical Exam


Vitals and I&O: 


 Vital Signs











Temp  97.5 F   18 14:00


 


Pulse  98   18 14:00


 


Resp  21   18 14:00


 


BP  115/55   18 14:00


 


Pulse Ox  96   18 14:00








 Intake & Output











 11/15/18 11/16/18 11/16/18





 18:59 06:59 18:59


 


Intake Total  120 


 


Balance  120 


 


Intake:   


 


  Oral  120 


 


Other:   


 


  # Voids  1 











Active Medications: 


Current Medications





Acetaminophen (Tylenol)  650 mg PO Q4HR PRN


   PRN Reason: Mild Pain / Temp above 100


   Stop: 19 02:37


Al Hydrox/Mg Hydrox/Simethicone (Maalox)  30 ml PO Q4HR PRN


   PRN Reason: GI DISTRESS


   Stop: 19 02:37


Aspirin (Aspirin Chewable)  81 mg PO DAILY [facility]


   Stop: 19 08:59


   Last Admin: 18 10:42 Dose:  Not Given


Bisacodyl (Dulcolax 10 Mg Supp)  10 mg RC DAILY PRN


   PRN Reason: Constipation


   Stop: 19 03:02


Buspirone HCl (Buspar)  10 mg PO BID [facility]; Protocol


   Stop: 19 08:59


   Last Admin: 18 10:43 Dose:  Not Given


Calcium Carbonate (Os-Andreina)  500 mg PO DAILY [facility]


   Stop: 19 08:59


   Last Admin: 18 10:43 Dose:  Not Given


Cholecalciferol (Vitamin D3)  1,000 iu PO DAILY [facility]


   Stop: 19 08:59


   Last Admin: 18 10:43 Dose:  Not Given


Docusate Sodium (Colace)  100 mg PO BID [facility]


   Stop: 19 08:59


   Last Admin: 18 10:43 Dose:  Not Given


Donepezil HCl (Aricept)  10 mg PO HS [facility]


   Stop: 19 20:59


   Last Admin: 11/15/18 21:27 Dose:  Not Given


Famotidine (Pepcid)  20 mg PO BID [facility]


   Stop: 19 08:59


   Last Admin: 18 10:43 Dose:  Not Given


Fluticasone Propionate (Flonase)  2 spr NS DAILY [facility]


   Stop: 19 08:59


   Last Admin: 18 10:43 Dose:  Not Given


Gabapentin (Neurontin)  300 mg PO HS [facility]


   Stop: 19 20:59


   Last Admin: 11/15/18 21:26 Dose:  Not Given


Lorazepam (Ativan)  0.5 mg PO Q4HR PRN; Protocol


   PRN Reason: Anxiety


   Stop: 18 02:37


   Last Admin: 11/10/18 16:44 Dose:  0.5 mg


Magnesium Hydroxide (Milk Of Magnesia)  30 ml PO HS PRN


   PRN Reason: Constipation


   Stop: 19 07:56


Memantine (Namenda)  10 mg PO DAILY [facility]


   Stop: 19 08:59


   Last Admin: 18 10:43 Dose:  Not Given


Metoprolol Tartrate (Lopressor)  25 mg PO Q12HR [facility]


   Stop: 19 08:59


   Last Admin: 18 10:44 Dose:  Not Given


Quetiapine Fumarate (Seroquel)  25 mg PO BID [facility]; Protocol


   Stop: 19 16:59


   Last Admin: 18 10:46 Dose:  Not Given


Sodium Phosphate (Fleet Enema)  135 ml RC Q48HR PRN


   PRN Reason: Constipation


   Stop: 19 03:02


Tolterodine Tartrate (Detrol La)  4 mg PO DAILY [facility]


   Stop: 19 08:59


   Last Admin: 18 10:46 Dose:  Not Given


Zolpidem Tartrate (Ambien)  5 mg PO HS PRN


   PRN Reason: Insomnia


   Stop: 19 02:37


   Last Admin: 18 21:41 Dose:  5 mg








General: demented


HEENT: NC/AT


Neck: Supple


Lungs: CTAB


Cardiovascular: Normal S1, Normal S2


Abdomen: soft, non-tender


Extremities: clear


Neurological: no change





Internal Medicine Assmt/Plan





- Assessment


Assessment: 





severe agitation 


bipolar disease 


h/o dementia


h/o arthritis 


h/o htn


h/o hyperlipidemia








- Plan


Plan: 





as per psych


will monitor 





Nutritional Asmnt/Malnutr-PDOC





- Dietary Evaluation


Malnutrition Findings (Please click <Entered> for more info): 








Nutritional Asmnt/Malnutrition                             Start:  18 12:

42


Text:                                                      Status: Complete    

  


Freq:                                                                          

  


Protocol:                                                                      

  


 Document     18 12:42  MIRNA  (Rec: 18 12:48  MIRNA  [id number])


 Nutritional Asmnt/Malnutrition


     Patient General Information


      Nutritional Screening                      Moderate Risk


      Diagnosis                                  psychosis NOS


      Pertinent Medical Hx/Surgical Hx           HTN, dyslipidemia, arthritis,


                                                 dementia, bipolar disorder


                                                 Per nurse note: COPD,


                                                 dermatitis, generalized muscle


                                                 weakness, atherosclerotic


                                                 heart disease, age related


                                                 osteoporosis, PVD,


                                                 polyneuropathy, Alzheimer's


                                                 disease


      Subjective Information                     Pt sleeping at time of visit.


                                                 Nursing noted PO intake: 75-


                                                 100%.


      Current Diet Order/ Nutrition Support      ANNE; ensure pudding TID


      Pertinent Medications                      Os-andreina, vit D3, colace, pepcid


                                                 , seroquel


      Pertinent Labs                             : Na 132, BUN 42, glucose


                                                 145, Alb 3.1


     Nutritional Hx/Data


      Height                                     1.68 m


      Height (Calculated Centimeters)            167.6


      Current Weight (lbs)                       61.235 kg


      Weight (Calculated Kilograms)              61.2


      Weight (Calculated Grams)                  00492.0


      Ideal Body Weight                          130 lb


      Body Mass Index (BMI)                      21.7


      Weight Status                              Approriate


     GI Symptoms


      GI Symptoms                                None


      Last BM                                    


      Difficult in:                              None


      Food Allergies                             No


      Skin Integrity/Comment:                    itching, intact, aren 15


      Current %PO                                Good (%)


     Estimated Nutritional Goals


      BEE in Kcals:                              Using Current wt


      Calories/Kcals/Kg                          25-30


      Kcals Calculated                           1298-3207


      Protein:                                   Using Current wt


      Protein g/k.0


      Protein Calculated                         61 g


      Fluid: ml                                  8659-2494 (1 ml/kcal)


     Nutritional Problem


      1. Problem


       Problem                                   no nutrition dx at this time


     Malnutrition Alert


      Is there a minimum of two criteria         No


       selected?                                 


       Query Text:Check all the applicable       


       criteria. A minimum of two criteria are   


       recommended for diagnosis of either       


       severe or non-severe malnutrition.        


     Malnutrition Related to Morbid Obesity


      Malnutrition related to morbid obesity     No


     Intervention/Recommendation


      Comments                                   1. Continue with ANNE diet and


                                                 ensure pudding TID as ordered


                                                 2. Consider starting CCHO diet


                                                 if blood glucose continues to


                                                 stay above normal limits


                                                 3. Monitor PO intake, wt, labs


                                                 and skin integrity


                                                 4. F/U as moderate risk in 3-5


                                                 days, 11/15-


     Expected Outcomes/Goals


      Expected Outcomes/Goals                    1. PO intake to continue


                                                 meeting at least 75% of all


                                                 meals


                                                 2. Wt stability, skin to


                                                 remain intact, and nutrition


                                                 related labs to approach


                                                 normal limits


                                                 Reviewed by Brianna Diana RD

## 2018-11-16 NOTE — PROGRESS NOTES
DATE:  11/15/2018



PSYCHIATRIC PROGRESS NOTE



SUBJECTIVE:  Chart reviewed and the patient interviewed.  Also, discussed the

patient's condition with the staff and reviewed the records and labs.  The

patient's affect is brighter.  The patient is less agitated and she is

cooperative with her treatment.  The patient also denies any intention to harm

herself or others.  Also, easier to redirect her.  The patient also is compliant

with taking her medications with no side effects of medications.



ASSESSMENT:  The patient is less agitated and less irritable.



TREATMENT PLAN:  Plan is to discharge the patient today to [place] with

a plan to monitor her behavior and her condition closely in [place].





DD: 11/15/2018 22:25

DT: 11/16/2018 20:24

JOB# [id number]  [id number]

## 2018-11-16 NOTE — PROGRESS NOTES
DATE:  11/14/2018



PSYCHIATRIC PROGRESS NOTE



SUBJECTIVE:  Chart reviewed and the patient interviewed.  Also discussed the

patient's condition with the staff and reviewed records and labs.  The patient

is still confused and she is still in irritable mood and easily agitated.  The

patient also is still suspicious and is still paranoid.  She also is still

resisting care and she is still fighting staff when they tried to help her and

she still gets aggressive and tried to hit staff.  She also is still at times

refusing to eat.  She also has difficulty following any of the staff directions.

 At the same time, Seroquel was increased to 25 mg twice a day.



ASSESSMENT:  The patient is still agitated and is still psychotic.



TREATMENT PLAN:  Continue to monitor her behavior and her condition closely. 

Also, continue adjusting psychotropic medications and working on behavioral

modification.





DD: 11/15/2018 21:54

DT: 11/16/2018 06:21

JOB# [id number]  [id number]

## 2018-11-17 NOTE — DISCHARGE SUMMARY
DATE OF DISCHARGE:  11/16/2018



PATIENT'S AGE:  83.



SEX:  Female.



PHYSICIAN:  Dr. Lopez.



PRIMARY DIAGNOSIS:  Unspecified psychosis.



MEDICAL DIAGNOSES

1.  Hypertension.

2.  Dyslipidemia.

3.  Arthritis.



REASON FOR HOSPITALIZATION:  The patient was admitted to the hospital because of

verbal abuse to staff and was yelling and striking out at staff and she was

easily agitated.



HOSPITAL COURSE:  The patient continued to be anxious and in irritable mood. 

The patient also was easily agitated and needed lots of redirections.  She was

resisting care and at times was refusing to take medications.  The patient

continued to take BuSpar in a dose of 10 mg twice a day.  She also was getting

Seroquel and the dose adjusted to 25 mg twice a day.  Gradually, the patient's

affect was brighter.  The patient was less agitated and less irritable and was

easy to redirect her and the patient was discharged from the hospital.



The patient had no major medical problems run in the hospital and no major blood

abnormalities.



AFTER DISCHARGE PLANS:  The patient discharged from the hospital and returned to

[facility] with plans for outpatient treatment there.



EXPECTED OUTCOME AFTER DISCHARGE:  Fair if the patient continues to follow up

with discharge plans and if takes her medications.





DD: 11/16/2018 19:45

DT: 11/17/2018 00:55

JOB# [id number]  [id number]

## 2019-07-31 NOTE — HISTORY & PHYSICAL
ADMIT DATE:  11/08/2018



This is her senior mental health admission.



CHIEF COMPLAINT:  Agitation.



The patient had 3 days of agitation, very hostile behavior, brought to the ER

with Dr. Mares saw the patient and cleared her for surgery complaining of no

chest pain.  No other problem.



REVIEW OF SYSTEMS:  Essentially the patient's agitation, otherwise negative.



PAST MEDICAL HISTORY:  Included hypertension, hyperlipidemia, arthritis,

dementia and psychiatric history of bipolar disorder.



PHYSICAL EXAMINATION:

GENERAL:  The patient is awake, alert.

HEAD:  Normal.

ENT:  Normal.

NECK:  Supple, nontender.

LUNGS:  Clear.

CARDIOVASCULAR SYSTEM:  S1, S2 heard.

ABDOMEN:  Soft.  Bowel sounds are heard.

CENTRAL NERVOUS SYSTEM:  The patient has severe psychomotor agitation.



The patient EKG was normal sinus rhythm, nonspecific ST-T changes, diagnosis of

severe agitation, bipolar disorder, history of dementia, history of arthritis,

history of hyperlipidemia, history of hypertension made and will followup with

patient medically and live with psychiatrist.





DD: 11/08/2018 07:58

DT: 11/08/2018 10:21

[facility]# [id number]  [id number]
Discharged